# Patient Record
Sex: MALE | Race: WHITE | Employment: OTHER | URBAN - NONMETROPOLITAN AREA
[De-identification: names, ages, dates, MRNs, and addresses within clinical notes are randomized per-mention and may not be internally consistent; named-entity substitution may affect disease eponyms.]

---

## 2017-01-09 ENCOUNTER — OFFICE VISIT (OUTPATIENT)
Dept: VASCULAR SURGERY | Age: 67
End: 2017-01-09
Payer: COMMERCIAL

## 2017-01-09 ENCOUNTER — PREP FOR PROCEDURE (OUTPATIENT)
Dept: VASCULAR SURGERY | Age: 67
End: 2017-01-09

## 2017-01-09 ENCOUNTER — HOSPITAL ENCOUNTER (OUTPATIENT)
Dept: VASCULAR LAB | Age: 67
Discharge: HOME | End: 2017-01-09
Payer: COMMERCIAL

## 2017-01-09 VITALS — SYSTOLIC BLOOD PRESSURE: 138 MMHG | TEMPERATURE: 98.8 F | HEART RATE: 77 BPM | DIASTOLIC BLOOD PRESSURE: 77 MMHG

## 2017-01-09 DIAGNOSIS — I70.213 ATHEROSCLEROSIS OF NATIVE ARTERY OF BOTH LOWER EXTREMITIES WITH INTERMITTENT CLAUDICATION (HCC): Primary | ICD-10-CM

## 2017-01-09 DIAGNOSIS — Z01.818 PRE-OP TESTING: ICD-10-CM

## 2017-01-09 PROCEDURE — 99213 OFFICE O/P EST LOW 20 MIN: CPT | Performed by: PHYSICIAN ASSISTANT

## 2017-01-09 PROCEDURE — 93971 EXTREMITY STUDY: CPT

## 2017-01-09 RX ORDER — SODIUM CHLORIDE 0.9 % (FLUSH) 0.9 %
10 SYRINGE (ML) INJECTION EVERY 12 HOURS SCHEDULED
Status: CANCELLED | OUTPATIENT
Start: 2017-01-09

## 2017-01-09 RX ORDER — ASPIRIN 81 MG/1
81 TABLET ORAL ONCE
Status: CANCELLED | OUTPATIENT
Start: 2017-01-09 | End: 2017-01-09

## 2017-01-09 RX ORDER — SODIUM CHLORIDE 0.9 % (FLUSH) 0.9 %
10 SYRINGE (ML) INJECTION PRN
Status: CANCELLED | OUTPATIENT
Start: 2017-01-09

## 2017-01-11 ENCOUNTER — APPOINTMENT (OUTPATIENT)
Dept: INTERVENTIONAL RADIOLOGY/VASCULAR | Age: 67
End: 2017-01-11
Attending: SURGERY
Payer: COMMERCIAL

## 2017-01-11 ENCOUNTER — ANESTHESIA (OUTPATIENT)
Dept: OPERATING ROOM | Age: 67
End: 2017-01-11
Payer: COMMERCIAL

## 2017-01-11 ENCOUNTER — ANESTHESIA EVENT (OUTPATIENT)
Dept: OPERATING ROOM | Age: 67
End: 2017-01-11
Payer: COMMERCIAL

## 2017-01-11 VITALS — RESPIRATION RATE: 1 BRPM | TEMPERATURE: 97.4 F | OXYGEN SATURATION: 99 %

## 2017-01-11 PROCEDURE — 6360000002 HC RX W HCPCS: Performed by: NURSE ANESTHETIST, CERTIFIED REGISTERED

## 2017-01-11 PROCEDURE — 2580000003 HC RX 258: Performed by: NURSE ANESTHETIST, CERTIFIED REGISTERED

## 2017-01-11 PROCEDURE — 6360000002 HC RX W HCPCS: Performed by: SURGERY

## 2017-01-11 PROCEDURE — 2500000003 HC RX 250 WO HCPCS: Performed by: NURSE ANESTHETIST, CERTIFIED REGISTERED

## 2017-01-11 RX ORDER — ONDANSETRON 2 MG/ML
INJECTION INTRAMUSCULAR; INTRAVENOUS PRN
Status: DISCONTINUED | OUTPATIENT
Start: 2017-01-11 | End: 2017-01-11 | Stop reason: SDUPTHER

## 2017-01-11 RX ORDER — FENTANYL CITRATE 50 UG/ML
INJECTION, SOLUTION INTRAMUSCULAR; INTRAVENOUS PRN
Status: DISCONTINUED | OUTPATIENT
Start: 2017-01-11 | End: 2017-01-11 | Stop reason: SDUPTHER

## 2017-01-11 RX ORDER — PROPOFOL 10 MG/ML
INJECTION, EMULSION INTRAVENOUS PRN
Status: DISCONTINUED | OUTPATIENT
Start: 2017-01-11 | End: 2017-01-11 | Stop reason: SDUPTHER

## 2017-01-11 RX ORDER — HEPARIN SODIUM 1000 [USP'U]/ML
INJECTION, SOLUTION INTRAVENOUS; SUBCUTANEOUS PRN
Status: DISCONTINUED | OUTPATIENT
Start: 2017-01-11 | End: 2017-01-11 | Stop reason: SDUPTHER

## 2017-01-11 RX ORDER — SODIUM CHLORIDE, SODIUM LACTATE, POTASSIUM CHLORIDE, CALCIUM CHLORIDE 600; 310; 30; 20 MG/100ML; MG/100ML; MG/100ML; MG/100ML
INJECTION, SOLUTION INTRAVENOUS CONTINUOUS PRN
Status: DISCONTINUED | OUTPATIENT
Start: 2017-01-11 | End: 2017-01-11 | Stop reason: SDUPTHER

## 2017-01-11 RX ORDER — EPHEDRINE SULFATE 50 MG/ML
INJECTION, SOLUTION INTRAVENOUS PRN
Status: DISCONTINUED | OUTPATIENT
Start: 2017-01-11 | End: 2017-01-11 | Stop reason: SDUPTHER

## 2017-01-11 RX ORDER — GLYCOPYRROLATE 0.2 MG/ML
INJECTION INTRAMUSCULAR; INTRAVENOUS PRN
Status: DISCONTINUED | OUTPATIENT
Start: 2017-01-11 | End: 2017-01-11 | Stop reason: SDUPTHER

## 2017-01-11 RX ORDER — ROCURONIUM BROMIDE 10 MG/ML
INJECTION, SOLUTION INTRAVENOUS PRN
Status: DISCONTINUED | OUTPATIENT
Start: 2017-01-11 | End: 2017-01-11 | Stop reason: SDUPTHER

## 2017-01-11 RX ADMIN — HEPARIN SODIUM 2000 UNITS: 1000 INJECTION, SOLUTION INTRAVENOUS; SUBCUTANEOUS at 09:15

## 2017-01-11 RX ADMIN — SODIUM CHLORIDE, SODIUM LACTATE, POTASSIUM CHLORIDE, AND CALCIUM CHLORIDE: 600; 310; 30; 20 INJECTION, SOLUTION INTRAVENOUS at 08:15

## 2017-01-11 RX ADMIN — ONDANSETRON HYDROCHLORIDE 4 MG: 2 INJECTION, SOLUTION INTRAVENOUS at 10:00

## 2017-01-11 RX ADMIN — FENTANYL CITRATE 50 MCG: 50 INJECTION INTRAMUSCULAR; INTRAVENOUS at 09:40

## 2017-01-11 RX ADMIN — HEPARIN SODIUM 3000 UNITS: 1000 INJECTION, SOLUTION INTRAVENOUS; SUBCUTANEOUS at 08:15

## 2017-01-11 RX ADMIN — ROCURONIUM BROMIDE 40 MG: 10 INJECTION INTRAVENOUS at 07:35

## 2017-01-11 RX ADMIN — PHENYLEPHRINE HYDROCHLORIDE 100 MCG: 10 INJECTION INTRAVENOUS at 10:00

## 2017-01-11 RX ADMIN — NEOSTIGMINE METHYLSULFATE 3 MG: 1 INJECTION, SOLUTION INTRAMUSCULAR; INTRAVENOUS; SUBCUTANEOUS at 10:00

## 2017-01-11 RX ADMIN — Medication 2 G: at 07:49

## 2017-01-11 RX ADMIN — EPHEDRINE SULFATE 10 MG: 50 INJECTION, SOLUTION INTRAMUSCULAR; INTRAVENOUS; SUBCUTANEOUS at 10:00

## 2017-01-11 RX ADMIN — EPHEDRINE SULFATE 10 MG: 50 INJECTION, SOLUTION INTRAMUSCULAR; INTRAVENOUS; SUBCUTANEOUS at 07:45

## 2017-01-11 RX ADMIN — FENTANYL CITRATE 100 MCG: 50 INJECTION INTRAMUSCULAR; INTRAVENOUS at 07:35

## 2017-01-11 RX ADMIN — GLYCOPYRROLATE 0.6 MG: 0.2 INJECTION, SOLUTION INTRAMUSCULAR; INTRAVENOUS at 10:00

## 2017-01-11 RX ADMIN — PROPOFOL 150 MG: 10 INJECTION, EMULSION INTRAVENOUS at 07:35

## 2017-01-11 RX ADMIN — SODIUM CHLORIDE, SODIUM LACTATE, POTASSIUM CHLORIDE, AND CALCIUM CHLORIDE: 600; 310; 30; 20 INJECTION, SOLUTION INTRAVENOUS at 07:29

## 2017-01-11 RX ADMIN — PHENYLEPHRINE HYDROCHLORIDE 50 MCG: 10 INJECTION INTRAVENOUS at 08:17

## 2017-01-11 RX ADMIN — HEPARIN SODIUM 3000 UNITS: 1000 INJECTION, SOLUTION INTRAVENOUS; SUBCUTANEOUS at 08:03

## 2017-01-11 RX ADMIN — EPHEDRINE SULFATE 10 MG: 50 INJECTION, SOLUTION INTRAMUSCULAR; INTRAVENOUS; SUBCUTANEOUS at 08:00

## 2017-01-11 RX ADMIN — PROPOFOL 50 MG: 10 INJECTION, EMULSION INTRAVENOUS at 07:40

## 2017-01-25 ENCOUNTER — OFFICE VISIT (OUTPATIENT)
Dept: VASCULAR SURGERY | Age: 67
End: 2017-01-25
Payer: COMMERCIAL

## 2017-01-25 VITALS
SYSTOLIC BLOOD PRESSURE: 130 MMHG | RESPIRATION RATE: 18 BRPM | HEART RATE: 73 BPM | DIASTOLIC BLOOD PRESSURE: 73 MMHG | TEMPERATURE: 96.7 F

## 2017-01-25 DIAGNOSIS — I70.213 ATHEROSCLEROSIS OF NATIVE ARTERY OF BOTH LOWER EXTREMITIES WITH INTERMITTENT CLAUDICATION (HCC): Primary | ICD-10-CM

## 2017-01-25 PROCEDURE — 99212 OFFICE O/P EST SF 10 MIN: CPT | Performed by: NURSE PRACTITIONER

## 2017-01-25 RX ORDER — OMEPRAZOLE 20 MG/1
20 CAPSULE, DELAYED RELEASE ORAL DAILY
COMMUNITY
End: 2020-11-09

## 2017-01-25 RX ORDER — PANTOPRAZOLE SODIUM 40 MG/1
40 TABLET, DELAYED RELEASE ORAL DAILY
Qty: 30 TABLET | Refills: 3 | Status: SHIPPED | OUTPATIENT
Start: 2017-01-25 | End: 2017-04-27 | Stop reason: CLARIF

## 2017-04-27 ENCOUNTER — HOSPITAL ENCOUNTER (OUTPATIENT)
Dept: VASCULAR LAB | Age: 67
Discharge: HOME | End: 2017-04-27
Payer: COMMERCIAL

## 2017-04-27 ENCOUNTER — OFFICE VISIT (OUTPATIENT)
Dept: VASCULAR SURGERY | Age: 67
End: 2017-04-27
Payer: COMMERCIAL

## 2017-04-27 VITALS
TEMPERATURE: 97.5 F | HEART RATE: 55 BPM | SYSTOLIC BLOOD PRESSURE: 138 MMHG | DIASTOLIC BLOOD PRESSURE: 70 MMHG | RESPIRATION RATE: 18 BRPM

## 2017-04-27 DIAGNOSIS — I70.213 ATHEROSCLEROSIS OF NATIVE ARTERY OF BOTH LOWER EXTREMITIES WITH INTERMITTENT CLAUDICATION (HCC): Primary | ICD-10-CM

## 2017-04-27 DIAGNOSIS — I70.213 ATHEROSCLEROSIS OF NATIVE ARTERY OF BOTH LOWER EXTREMITIES WITH INTERMITTENT CLAUDICATION (HCC): ICD-10-CM

## 2017-04-27 PROCEDURE — 99212 OFFICE O/P EST SF 10 MIN: CPT | Performed by: NURSE PRACTITIONER

## 2017-04-27 PROCEDURE — 93922 UPR/L XTREMITY ART 2 LEVELS: CPT

## 2017-04-27 PROCEDURE — 93925 LOWER EXTREMITY STUDY: CPT

## 2017-04-27 RX ORDER — LOSARTAN POTASSIUM 50 MG/1
50 TABLET ORAL DAILY
COMMUNITY
End: 2018-07-05

## 2017-04-27 RX ORDER — ONDANSETRON 4 MG/1
4 TABLET, ORALLY DISINTEGRATING ORAL EVERY 8 HOURS PRN
COMMUNITY
End: 2018-05-16 | Stop reason: ALTCHOICE

## 2017-04-27 RX ORDER — POLYETHYLENE GLYCOL 3350 17 G/17G
17 POWDER, FOR SOLUTION ORAL DAILY
COMMUNITY
End: 2022-02-16 | Stop reason: CLARIF

## 2017-08-02 ENCOUNTER — HOSPITAL ENCOUNTER (OUTPATIENT)
Dept: VASCULAR LAB | Age: 67
Discharge: HOME | End: 2017-08-02
Payer: COMMERCIAL

## 2017-08-02 ENCOUNTER — OFFICE VISIT (OUTPATIENT)
Dept: VASCULAR SURGERY | Age: 67
End: 2017-08-02
Payer: COMMERCIAL

## 2017-08-02 VITALS
SYSTOLIC BLOOD PRESSURE: 156 MMHG | BODY MASS INDEX: 24.96 KG/M2 | DIASTOLIC BLOOD PRESSURE: 82 MMHG | RESPIRATION RATE: 18 BRPM | TEMPERATURE: 98.7 F | HEART RATE: 59 BPM | WEIGHT: 159 LBS | HEIGHT: 67 IN

## 2017-08-02 DIAGNOSIS — I73.9 PVD (PERIPHERAL VASCULAR DISEASE) (HCC): Primary | ICD-10-CM

## 2017-08-02 DIAGNOSIS — I70.213 ATHEROSCLEROSIS OF NATIVE ARTERY OF BOTH LOWER EXTREMITIES WITH INTERMITTENT CLAUDICATION (HCC): ICD-10-CM

## 2017-08-02 PROCEDURE — 99213 OFFICE O/P EST LOW 20 MIN: CPT | Performed by: PHYSICIAN ASSISTANT

## 2017-08-02 PROCEDURE — 93925 LOWER EXTREMITY STUDY: CPT

## 2017-08-02 PROCEDURE — 93922 UPR/L XTREMITY ART 2 LEVELS: CPT

## 2017-08-02 RX ORDER — TRIAMCINOLONE ACETONIDE 0.25 MG/G
OINTMENT TOPICAL
Qty: 18 G | Refills: 0 | Status: SHIPPED | OUTPATIENT
Start: 2017-08-02 | End: 2017-08-09

## 2017-08-02 RX ORDER — CLOPIDOGREL BISULFATE 75 MG/1
75 TABLET ORAL DAILY
Qty: 30 TABLET | Refills: 5 | Status: SHIPPED | OUTPATIENT
Start: 2017-08-02 | End: 2017-10-16 | Stop reason: SDUPTHER

## 2017-08-08 DIAGNOSIS — I70.213 ATHEROSCLEROSIS OF NATIVE ARTERY OF BOTH LOWER EXTREMITIES WITH INTERMITTENT CLAUDICATION (HCC): Primary | ICD-10-CM

## 2017-10-16 RX ORDER — CLOPIDOGREL BISULFATE 75 MG/1
75 TABLET ORAL DAILY
Qty: 30 TABLET | Refills: 3 | Status: SHIPPED | OUTPATIENT
Start: 2017-10-16 | End: 2018-01-19 | Stop reason: SDUPTHER

## 2018-02-15 ENCOUNTER — OFFICE VISIT (OUTPATIENT)
Dept: VASCULAR SURGERY | Age: 68
End: 2018-02-15
Payer: MEDICARE

## 2018-02-15 ENCOUNTER — HOSPITAL ENCOUNTER (OUTPATIENT)
Dept: VASCULAR LAB | Age: 68
Discharge: HOME | End: 2018-02-15
Payer: MEDICARE

## 2018-02-15 ENCOUNTER — TELEPHONE (OUTPATIENT)
Dept: VASCULAR SURGERY | Age: 68
End: 2018-02-15

## 2018-02-15 VITALS
RESPIRATION RATE: 18 BRPM | TEMPERATURE: 97.6 F | HEART RATE: 73 BPM | OXYGEN SATURATION: 98 % | SYSTOLIC BLOOD PRESSURE: 132 MMHG | DIASTOLIC BLOOD PRESSURE: 80 MMHG

## 2018-02-15 DIAGNOSIS — I70.213 ATHEROSCLEROSIS OF NATIVE ARTERY OF BOTH LOWER EXTREMITIES WITH INTERMITTENT CLAUDICATION (HCC): Primary | ICD-10-CM

## 2018-02-15 DIAGNOSIS — I70.213 ATHEROSCLEROSIS OF NATIVE ARTERY OF BOTH LOWER EXTREMITIES WITH INTERMITTENT CLAUDICATION (HCC): ICD-10-CM

## 2018-02-15 PROCEDURE — 93925 LOWER EXTREMITY STUDY: CPT

## 2018-02-15 PROCEDURE — 1123F ACP DISCUSS/DSCN MKR DOCD: CPT | Performed by: NURSE PRACTITIONER

## 2018-02-15 PROCEDURE — G8484 FLU IMMUNIZE NO ADMIN: HCPCS | Performed by: NURSE PRACTITIONER

## 2018-02-15 PROCEDURE — G8427 DOCREV CUR MEDS BY ELIG CLIN: HCPCS | Performed by: NURSE PRACTITIONER

## 2018-02-15 PROCEDURE — 4040F PNEUMOC VAC/ADMIN/RCVD: CPT | Performed by: NURSE PRACTITIONER

## 2018-02-15 PROCEDURE — 99213 OFFICE O/P EST LOW 20 MIN: CPT | Performed by: NURSE PRACTITIONER

## 2018-02-15 PROCEDURE — G8421 BMI NOT CALCULATED: HCPCS | Performed by: NURSE PRACTITIONER

## 2018-02-15 PROCEDURE — 4004F PT TOBACCO SCREEN RCVD TLK: CPT | Performed by: NURSE PRACTITIONER

## 2018-02-15 PROCEDURE — 3017F COLORECTAL CA SCREEN DOC REV: CPT | Performed by: NURSE PRACTITIONER

## 2018-02-15 PROCEDURE — G8598 ASA/ANTIPLAT THER USED: HCPCS | Performed by: NURSE PRACTITIONER

## 2018-02-15 PROCEDURE — 93922 UPR/L XTREMITY ART 2 LEVELS: CPT

## 2018-02-15 NOTE — TELEPHONE ENCOUNTER
I scheduled the patient an appt with  in [facility]. I gave the patient his appointment for next Wednesday 2/21/18 at 2:15p. The patient confirmed the appointment.

## 2018-02-15 NOTE — PROGRESS NOTES
MINUTES AFTER THE SAME MEAL QD  11    ASPIRIN LOW DOSE 81 MG EC tablet TK 1 T PO QD  11     No current facility-administered medications for this visit. Allergies: Review of patient's allergies indicates no known allergies. Past Medical History:   Diagnosis Date    Depression     GERD with esophagitis     Hyperlipidemia     Hypertension     Neuropathy ([address]      Past Surgical History:   Procedure Laterality Date    COLONOSCOPY      FEMORAL BYPASS Left 11/16/2016    LEFT LOWER EXTREMITY ANGIOGRAM WITH ATHERECTOMY, BALLOON ANGIPLASTY, AND STENTING OF LEFT FEMORAL AND POPLITEAL ARTERIES performed by Kemi Tomas MD at [facility] Right 1/11/2017    INTRAOPERATIVE ANGIOGRAM RIGHT LOWER EXTREMITY WITH PERCUTANEOUS TRANSLUMINAL BALLOON ANGIOPLASTY AND STENTING OF RIGHT SUPERFICIAL FEMORAL/ POPLITEAL ARTERIES performed by Kemi Tomas MD at [address] [facility] VASCULAR SURGERY  10/11/2016    Right CFA 5f sheath, Aortogram with bilateral lower arteriogram, mynx right CFA.  [facility]    VASCULAR SURGERY  11/16/2016    SJS-Ultrasound guided left PTA 4f sheath, right CFA 5f-7f sheath, left lower arteriograms, placement of left popliteal artery emboshield filter, atherectomy left SFA with 2.1/3.0 jetstream, left popliteal/sfa balloon angioplasty 5x200 desiree, 6x150 lutonix (3), left popliteal/sfa stent 6x200 innova, left SFA stents (supera 5.5x150, 5.5x60), left lower extremity arteriograms, mynx right CFA    VASCULAR SURGERY  01/11/2017    [facility]-Ultrasound left CFA 5f-7f 70cm sheath, right lower arteriograms, right ROSEMARIE u/s guided 4f sheath, balloon angioplasty right SFA/Pop occulsion with two 5x100 desiree,Right SFA/pop balloon angioplasty 6x200 desiree, right SFA/pop stents (supera 5.5x150, 5.5x60),Right SFA/pop stents balloon angioplasty 6x150 lutonix, right lower arteriograms, right SFA stent (epic 7x40), right lower arteriograms    VASCULAR SURGERY  01/11/2017 distress. HENT - head normocephalic. Right external ear canal appears normal.  Left external ear canal appears normal.  Septum appears midline. Eyes - conjunctiva normal.  EOMS normal.  No exudate. No icterus. Neck- ROM appears normal, no tracheal deviation. Cardiovascular - Regular rate and rhythm. Heart sounds are normal.  No murmur, rub, or gallop. Carotid pulses are 2+ to palpation bilaterally without bruit. Extremities - Radial and brachial pulses are 2+ to palpation bilaterally. Right femoral pulse: present 2+; Right popliteal pulse: absent Right DP: present 2+; Right PT absent; Left femoral pulse: present 2+; Left popliteal pulse: absent; Left DP: present 2+; Left PT: absent No cyanosis, clubbing, or significant edema. No signs atheroembolic event. Pulmonary - effort appears normal.  No respiratory distress. Lungs - Breath sounds normal. No wheezes or rales. GI - Abdomen - soft, non tender, bowel sounds X 4 quadrants. No guarding or rebound tenderness. No distension or palpable mass. Genitourinary - deferred. Musculoskeletal - ROM appears normal.  No significant edema. Neurologic - alert and oriented X 3. Physiologic. Skin - warm, dry, and intact. No rash, erythema, or pallor. Psychiatric - mood, affect, and behavior appear normal.  Judgment and thought processes appear normal.    Risk factors for atherosclerosis of all vascular beds have been reviewed with the patient including:  Family history, tobacco abuse in all forms, elevated cholesterol, hyperlipidemia, and diabetes. Lower extremity arterial study: Right VADIM 1.02, Left VADIM 0.99 with pop velocity of 275. Individual films reviewed: Yes. Test results were reviewed with the patient. Disease process has progressed      Options have been discussed with the patient including continued medical management and angiography and potential intervention. Patient has opted to proceed with angiography and potential intervention.

## 2018-05-03 ENCOUNTER — HOSPITAL ENCOUNTER (OUTPATIENT)
Dept: VASCULAR LAB | Age: 68
Discharge: HOME | End: 2018-05-03
Payer: MEDICARE

## 2018-05-03 ENCOUNTER — OFFICE VISIT (OUTPATIENT)
Dept: VASCULAR SURGERY | Age: 68
End: 2018-05-03
Payer: MEDICARE

## 2018-05-03 VITALS — DIASTOLIC BLOOD PRESSURE: 69 MMHG | HEART RATE: 79 BPM | SYSTOLIC BLOOD PRESSURE: 107 MMHG

## 2018-05-03 DIAGNOSIS — I70.213 ATHEROSCLEROSIS OF NATIVE ARTERY OF BOTH LOWER EXTREMITIES WITH INTERMITTENT CLAUDICATION (HCC): ICD-10-CM

## 2018-05-03 DIAGNOSIS — Z01.818 PRE-OP TESTING: ICD-10-CM

## 2018-05-03 DIAGNOSIS — I70.213 ATHEROSCLEROSIS OF NATIVE ARTERY OF BOTH LOWER EXTREMITIES WITH INTERMITTENT CLAUDICATION (HCC): Primary | ICD-10-CM

## 2018-05-03 LAB
ANION GAP SERPL CALCULATED.3IONS-SCNC: 12 MMOL/L (ref 7–19)
BUN BLDV-MCNC: 13 MG/DL (ref 8–23)
CALCIUM SERPL-MCNC: 9.6 MG/DL (ref 8.8–10.2)
CHLORIDE BLD-SCNC: 99 MMOL/L (ref 98–111)
CO2: 27 MMOL/L (ref 22–29)
CREAT SERPL-MCNC: 1.2 MG/DL (ref 0.5–1.2)
GFR NON-AFRICAN AMERICAN: >60
GLUCOSE BLD-MCNC: 96 MG/DL (ref 74–109)
HCT VFR BLD CALC: 43.6 % (ref 42–52)
HEMOGLOBIN: 14.5 G/DL (ref 14–18)
MCH RBC QN AUTO: 30.9 PG (ref 27–31)
MCHC RBC AUTO-ENTMCNC: 33.3 G/DL (ref 33–37)
MCV RBC AUTO: 93 FL (ref 80–94)
PDW BLD-RTO: 13.2 % (ref 11.5–14.5)
PLATELET # BLD: 311 K/UL (ref 130–400)
PMV BLD AUTO: 9.4 FL (ref 9.4–12.4)
POTASSIUM SERPL-SCNC: 4.6 MMOL/L (ref 3.5–5)
RBC # BLD: 4.69 M/UL (ref 4.7–6.1)
SODIUM BLD-SCNC: 138 MMOL/L (ref 136–145)
WBC # BLD: 11.7 K/UL (ref 4.8–10.8)

## 2018-05-03 PROCEDURE — 99213 OFFICE O/P EST LOW 20 MIN: CPT | Performed by: PHYSICIAN ASSISTANT

## 2018-05-03 PROCEDURE — 93922 UPR/L XTREMITY ART 2 LEVELS: CPT

## 2018-05-03 PROCEDURE — 93925 LOWER EXTREMITY STUDY: CPT

## 2018-05-03 RX ORDER — PANTOPRAZOLE SODIUM 40 MG/1
40 GRANULE, DELAYED RELEASE ORAL
COMMUNITY

## 2018-05-03 RX ORDER — HYDROCODONE BITARTRATE AND ACETAMINOPHEN 5; 325 MG/1; MG/1
1 TABLET ORAL EVERY 6 HOURS PRN
COMMUNITY
End: 2018-05-03 | Stop reason: CLARIF

## 2018-05-03 RX ORDER — ACETAMINOPHEN 325 MG/1
650 TABLET ORAL EVERY 6 HOURS PRN
COMMUNITY
End: 2022-02-16 | Stop reason: CLARIF

## 2018-05-03 RX ORDER — COVID-19 ANTIGEN TEST
KIT MISCELLANEOUS EVERY 4 HOURS PRN
COMMUNITY
End: 2020-05-21

## 2018-05-04 ENCOUNTER — PREP FOR PROCEDURE (OUTPATIENT)
Dept: VASCULAR SURGERY | Age: 68
End: 2018-05-04

## 2018-05-04 RX ORDER — SODIUM CHLORIDE 0.9 % (FLUSH) 0.9 %
10 SYRINGE (ML) INJECTION PRN
Status: CANCELLED | OUTPATIENT
Start: 2018-05-04

## 2018-05-04 RX ORDER — ASPIRIN 81 MG/1
81 TABLET ORAL ONCE
Status: CANCELLED | OUTPATIENT
Start: 2018-05-04 | End: 2018-05-04

## 2018-05-04 RX ORDER — SODIUM CHLORIDE 9 MG/ML
INJECTION, SOLUTION INTRAVENOUS CONTINUOUS
Status: CANCELLED | OUTPATIENT
Start: 2018-05-04

## 2018-05-16 ENCOUNTER — HOSPITAL ENCOUNTER (OUTPATIENT)
Dept: INTERVENTIONAL RADIOLOGY/VASCULAR | Age: 68
Discharge: HOME | End: 2018-05-16
Payer: MEDICARE

## 2018-05-16 VITALS
WEIGHT: 163 LBS | SYSTOLIC BLOOD PRESSURE: 154 MMHG | OXYGEN SATURATION: 99 % | BODY MASS INDEX: 26.2 KG/M2 | RESPIRATION RATE: 25 BRPM | HEIGHT: 66 IN | TEMPERATURE: 97.7 F | DIASTOLIC BLOOD PRESSURE: 76 MMHG | HEART RATE: 67 BPM

## 2018-05-16 DIAGNOSIS — I70.213 ATHEROSCLEROSIS OF NATIVE ARTERIES OF EXTREMITIES WITH INTERMITTENT CLAUDICATION, BILATERAL LEGS (HCC): ICD-10-CM

## 2018-05-16 PROCEDURE — G0269 OCCLUSIVE DEVICE IN VEIN ART: HCPCS | Performed by: SURGERY

## 2018-05-16 PROCEDURE — 36200 PLACE CATHETER IN AORTA: CPT | Performed by: SURGERY

## 2018-05-16 PROCEDURE — 6360000004 HC RX CONTRAST MEDICATION: Performed by: SURGERY

## 2018-05-16 PROCEDURE — 2500000003 HC RX 250 WO HCPCS: Performed by: SURGERY

## 2018-05-16 PROCEDURE — 2580000003 HC RX 258: Performed by: PHYSICIAN ASSISTANT

## 2018-05-16 PROCEDURE — 75716 ARTERY X-RAYS ARMS/LEGS: CPT | Performed by: SURGERY

## 2018-05-16 PROCEDURE — 6370000000 HC RX 637 (ALT 250 FOR IP): Performed by: PHYSICIAN ASSISTANT

## 2018-05-16 PROCEDURE — 6360000002 HC RX W HCPCS: Performed by: SURGERY

## 2018-05-16 PROCEDURE — 75625 CONTRAST EXAM ABDOMINL AORTA: CPT | Performed by: SURGERY

## 2018-05-16 PROCEDURE — 6360000002 HC RX W HCPCS: Performed by: PHYSICIAN ASSISTANT

## 2018-05-16 RX ORDER — HYDROCODONE BITARTRATE AND ACETAMINOPHEN 5; 325 MG/1; MG/1
1 TABLET ORAL EVERY 4 HOURS PRN
Status: DISCONTINUED | OUTPATIENT
Start: 2018-05-16 | End: 2018-05-18 | Stop reason: HOSPADM

## 2018-05-16 RX ORDER — ONDANSETRON 2 MG/ML
4 INJECTION INTRAMUSCULAR; INTRAVENOUS EVERY 8 HOURS PRN
Status: DISCONTINUED | OUTPATIENT
Start: 2018-05-16 | End: 2018-05-18 | Stop reason: HOSPADM

## 2018-05-16 RX ORDER — ASPIRIN 81 MG/1
81 TABLET ORAL ONCE
Status: COMPLETED | OUTPATIENT
Start: 2018-05-16 | End: 2018-05-16

## 2018-05-16 RX ORDER — FENTANYL CITRATE 50 UG/ML
INJECTION, SOLUTION INTRAMUSCULAR; INTRAVENOUS
Status: COMPLETED | OUTPATIENT
Start: 2018-05-16 | End: 2018-05-16

## 2018-05-16 RX ORDER — HEPARIN SODIUM 5000 [USP'U]/ML
INJECTION, SOLUTION INTRAVENOUS; SUBCUTANEOUS
Status: COMPLETED | OUTPATIENT
Start: 2018-05-16 | End: 2018-05-16

## 2018-05-16 RX ORDER — CILOSTAZOL 100 MG/1
50 TABLET ORAL 2 TIMES DAILY
Qty: 60 TABLET | Refills: 5 | Status: SHIPPED | OUTPATIENT
Start: 2018-05-16 | End: 2018-07-05

## 2018-05-16 RX ORDER — ACETAMINOPHEN 325 MG/1
650 TABLET ORAL EVERY 4 HOURS PRN
Status: DISCONTINUED | OUTPATIENT
Start: 2018-05-16 | End: 2018-05-18 | Stop reason: HOSPADM

## 2018-05-16 RX ORDER — IODIXANOL 320 MG/ML
INJECTION, SOLUTION INTRAVASCULAR
Status: COMPLETED | OUTPATIENT
Start: 2018-05-16 | End: 2018-05-16

## 2018-05-16 RX ORDER — SODIUM CHLORIDE 0.9 % (FLUSH) 0.9 %
10 SYRINGE (ML) INJECTION PRN
Status: DISCONTINUED | OUTPATIENT
Start: 2018-05-16 | End: 2018-05-18 | Stop reason: HOSPADM

## 2018-05-16 RX ORDER — HYDROCODONE BITARTRATE AND ACETAMINOPHEN 5; 325 MG/1; MG/1
2 TABLET ORAL EVERY 4 HOURS PRN
Status: DISCONTINUED | OUTPATIENT
Start: 2018-05-16 | End: 2018-05-18 | Stop reason: HOSPADM

## 2018-05-16 RX ORDER — MIDAZOLAM HYDROCHLORIDE 1 MG/ML
INJECTION INTRAMUSCULAR; INTRAVENOUS
Status: COMPLETED | OUTPATIENT
Start: 2018-05-16 | End: 2018-05-16

## 2018-05-16 RX ORDER — SODIUM CHLORIDE 9 MG/ML
INJECTION, SOLUTION INTRAVENOUS CONTINUOUS
Status: DISCONTINUED | OUTPATIENT
Start: 2018-05-16 | End: 2018-05-18 | Stop reason: HOSPADM

## 2018-05-16 RX ORDER — LIDOCAINE HYDROCHLORIDE 20 MG/ML
INJECTION, SOLUTION INFILTRATION; PERINEURAL
Status: COMPLETED | OUTPATIENT
Start: 2018-05-16 | End: 2018-05-16

## 2018-05-16 RX ADMIN — ASPIRIN 81 MG: 81 TABLET, COATED ORAL at 11:36

## 2018-05-16 RX ADMIN — FENTANYL CITRATE 25 MCG: 50 INJECTION, SOLUTION INTRAMUSCULAR; INTRAVENOUS at 12:14

## 2018-05-16 RX ADMIN — Medication 2 G: at 12:04

## 2018-05-16 RX ADMIN — LIDOCAINE HYDROCHLORIDE 10 ML: 20 INJECTION, SOLUTION INFILTRATION; PERINEURAL at 12:17

## 2018-05-16 RX ADMIN — SODIUM CHLORIDE: 9 INJECTION, SOLUTION INTRAVENOUS at 11:28

## 2018-05-16 RX ADMIN — IODIXANOL 120 ML: 320 INJECTION, SOLUTION INTRAVASCULAR at 12:33

## 2018-05-16 RX ADMIN — HEPARIN SODIUM 5000 UNITS: 5000 INJECTION, SOLUTION INTRAVENOUS; SUBCUTANEOUS at 12:08

## 2018-05-16 RX ADMIN — MIDAZOLAM HYDROCHLORIDE 1 MG: 1 INJECTION, SOLUTION INTRAMUSCULAR; INTRAVENOUS at 12:14

## 2018-06-04 ENCOUNTER — OFFICE VISIT (OUTPATIENT)
Dept: VASCULAR SURGERY | Age: 68
End: 2018-06-04
Payer: MEDICARE

## 2018-06-04 VITALS
HEART RATE: 79 BPM | TEMPERATURE: 97.4 F | DIASTOLIC BLOOD PRESSURE: 70 MMHG | OXYGEN SATURATION: 97 % | SYSTOLIC BLOOD PRESSURE: 120 MMHG | RESPIRATION RATE: 18 BRPM

## 2018-06-04 DIAGNOSIS — I70.213 ATHEROSCLEROSIS OF NATIVE ARTERIES OF EXTREMITIES WITH INTERMITTENT CLAUDICATION, BILATERAL LEGS (HCC): Primary | ICD-10-CM

## 2018-06-04 PROCEDURE — 99213 OFFICE O/P EST LOW 20 MIN: CPT | Performed by: PHYSICIAN ASSISTANT

## 2018-06-21 ENCOUNTER — TELEPHONE (OUTPATIENT)
Dept: VASCULAR SURGERY | Age: 68
End: 2018-06-21

## 2018-06-21 DIAGNOSIS — Z01.818 PRE-OP TESTING: Primary | ICD-10-CM

## 2018-06-22 ENCOUNTER — PREP FOR PROCEDURE (OUTPATIENT)
Dept: VASCULAR SURGERY | Age: 68
End: 2018-06-22

## 2018-06-22 RX ORDER — SODIUM CHLORIDE 0.9 % (FLUSH) 0.9 %
10 SYRINGE (ML) INJECTION EVERY 12 HOURS SCHEDULED
Status: CANCELLED | OUTPATIENT
Start: 2018-06-22

## 2018-06-22 RX ORDER — SODIUM CHLORIDE 0.9 % (FLUSH) 0.9 %
10 SYRINGE (ML) INJECTION PRN
Status: CANCELLED | OUTPATIENT
Start: 2018-06-22

## 2018-06-22 RX ORDER — ASPIRIN 81 MG/1
81 TABLET ORAL ONCE
Status: CANCELLED | OUTPATIENT
Start: 2018-06-22 | End: 2018-06-22

## 2018-07-05 ENCOUNTER — HOSPITAL ENCOUNTER (OUTPATIENT)
Dept: GENERAL RADIOLOGY | Age: 68
Discharge: HOME | End: 2018-07-05
Payer: MEDICARE

## 2018-07-05 ENCOUNTER — HOSPITAL ENCOUNTER (OUTPATIENT)
Dept: PREADMISSION TESTING | Age: 68
Discharge: HOME | End: 2018-07-09
Payer: MEDICARE

## 2018-07-05 ENCOUNTER — HOSPITAL ENCOUNTER (OUTPATIENT)
Dept: VASCULAR LAB | Age: 68
Discharge: HOME | End: 2018-07-05
Payer: MEDICARE

## 2018-07-05 VITALS — BODY MASS INDEX: 25.27 KG/M2 | WEIGHT: 161 LBS | HEIGHT: 67 IN

## 2018-07-05 DIAGNOSIS — Z01.818 PRE-OP TESTING: ICD-10-CM

## 2018-07-05 LAB
ANION GAP SERPL CALCULATED.3IONS-SCNC: 14 MMOL/L (ref 7–19)
APTT: 26.1 SEC (ref 26–36.2)
BASOPHILS ABSOLUTE: 0.1 K/UL (ref 0–0.2)
BASOPHILS RELATIVE PERCENT: 0.8 % (ref 0–1)
BUN BLDV-MCNC: 15 MG/DL (ref 8–23)
CALCIUM SERPL-MCNC: 9.8 MG/DL (ref 8.8–10.2)
CHLORIDE BLD-SCNC: 100 MMOL/L (ref 98–111)
CO2: 25 MMOL/L (ref 22–29)
CREAT SERPL-MCNC: 1.1 MG/DL (ref 0.5–1.2)
EOSINOPHILS ABSOLUTE: 0.2 K/UL (ref 0–0.6)
EOSINOPHILS RELATIVE PERCENT: 1.8 % (ref 0–5)
GFR NON-AFRICAN AMERICAN: >60
GLUCOSE BLD-MCNC: 75 MG/DL (ref 74–109)
HCT VFR BLD CALC: 44 % (ref 42–52)
HEMOGLOBIN: 14.4 G/DL (ref 14–18)
INR BLD: 0.89 (ref 0.88–1.18)
LYMPHOCYTES ABSOLUTE: 3.4 K/UL (ref 1.1–4.5)
LYMPHOCYTES RELATIVE PERCENT: 32.6 % (ref 20–40)
MCH RBC QN AUTO: 30.7 PG (ref 27–31)
MCHC RBC AUTO-ENTMCNC: 32.7 G/DL (ref 33–37)
MCV RBC AUTO: 93.8 FL (ref 80–94)
MONOCYTES ABSOLUTE: 1.1 K/UL (ref 0–0.9)
MONOCYTES RELATIVE PERCENT: 10.1 % (ref 0–10)
NEUTROPHILS ABSOLUTE: 5.6 K/UL (ref 1.5–7.5)
NEUTROPHILS RELATIVE PERCENT: 53.9 % (ref 50–65)
PDW BLD-RTO: 13.4 % (ref 11.5–14.5)
PLATELET # BLD: 347 K/UL (ref 130–400)
PMV BLD AUTO: 9.4 FL (ref 9.4–12.4)
POTASSIUM SERPL-SCNC: 4.3 MMOL/L (ref 3.5–5)
PROTHROMBIN TIME: 11.9 SEC (ref 12–14.6)
RBC # BLD: 4.69 M/UL (ref 4.7–6.1)
SODIUM BLD-SCNC: 139 MMOL/L (ref 136–145)
WBC # BLD: 10.4 K/UL (ref 4.8–10.8)

## 2018-07-05 PROCEDURE — 93005 ELECTROCARDIOGRAM TRACING: CPT

## 2018-07-05 PROCEDURE — 85610 PROTHROMBIN TIME: CPT

## 2018-07-05 PROCEDURE — 93970 EXTREMITY STUDY: CPT

## 2018-07-05 PROCEDURE — 85730 THROMBOPLASTIN TIME PARTIAL: CPT

## 2018-07-05 PROCEDURE — 80048 BASIC METABOLIC PNL TOTAL CA: CPT

## 2018-07-05 PROCEDURE — 71046 X-RAY EXAM CHEST 2 VIEWS: CPT

## 2018-07-05 PROCEDURE — 85025 COMPLETE CBC W/AUTO DIFF WBC: CPT

## 2018-07-05 PROCEDURE — 87070 CULTURE OTHR SPECIMN AEROBIC: CPT

## 2018-07-05 RX ORDER — ONDANSETRON 4 MG/1
4 TABLET, ORALLY DISINTEGRATING ORAL EVERY 8 HOURS PRN
COMMUNITY
End: 2022-02-16 | Stop reason: CLARIF

## 2018-07-05 RX ORDER — LISINOPRIL 20 MG/1
20 TABLET ORAL DAILY
COMMUNITY
End: 2022-02-16 | Stop reason: CLARIF

## 2018-07-05 RX ORDER — GABAPENTIN 300 MG/1
300 CAPSULE ORAL NIGHTLY
COMMUNITY
End: 2022-02-16 | Stop reason: CLARIF

## 2018-07-05 RX ORDER — HYDROCODONE BITARTRATE AND ACETAMINOPHEN 5; 325 MG/1; MG/1
1 TABLET ORAL EVERY 6 HOURS PRN
Status: ON HOLD | COMMUNITY
End: 2018-07-17 | Stop reason: ALTCHOICE

## 2018-07-05 RX ORDER — NICOTINE 21 MG/24HR
1 PATCH, TRANSDERMAL 24 HOURS TRANSDERMAL EVERY 24 HOURS
COMMUNITY
End: 2020-05-21

## 2018-07-05 RX ORDER — TAMSULOSIN HYDROCHLORIDE 0.4 MG/1
0.4 CAPSULE ORAL DAILY
COMMUNITY
End: 2020-11-09

## 2018-07-06 LAB
EKG P AXIS: 17 DEGREES
EKG P-R INTERVAL: 138 MS
EKG Q-T INTERVAL: 418 MS
EKG QRS DURATION: 96 MS
EKG QTC CALCULATION (BAZETT): 426 MS
EKG T AXIS: 28 DEGREES
MRSA CULTURE ONLY: NORMAL

## 2018-07-17 ENCOUNTER — HOSPITAL ENCOUNTER (OUTPATIENT)
Dept: VASCULAR LAB | Age: 68
Discharge: HOME | DRG: 253 | End: 2018-07-17
Payer: MEDICARE

## 2018-07-17 ENCOUNTER — HOSPITAL ENCOUNTER (INPATIENT)
Age: 68
LOS: 2 days | Discharge: HOME | DRG: 253 | End: 2018-07-19
Attending: SURGERY | Admitting: SURGERY
Payer: MEDICARE

## 2018-07-17 ENCOUNTER — ANESTHESIA (OUTPATIENT)
Dept: OPERATING ROOM | Age: 68
DRG: 253 | End: 2018-07-17
Payer: MEDICARE

## 2018-07-17 ENCOUNTER — ANESTHESIA EVENT (OUTPATIENT)
Dept: OPERATING ROOM | Age: 68
DRG: 253 | End: 2018-07-17
Payer: MEDICARE

## 2018-07-17 ENCOUNTER — APPOINTMENT (OUTPATIENT)
Dept: INTERVENTIONAL RADIOLOGY/VASCULAR | Age: 68
DRG: 253 | End: 2018-07-17
Attending: SURGERY
Payer: MEDICARE

## 2018-07-17 VITALS
DIASTOLIC BLOOD PRESSURE: 63 MMHG | TEMPERATURE: 97.4 F | OXYGEN SATURATION: 100 % | SYSTOLIC BLOOD PRESSURE: 103 MMHG | RESPIRATION RATE: 18 BRPM

## 2018-07-17 DIAGNOSIS — I70.213 ATHEROSCLEROSIS OF NATIVE ARTERIES OF EXTREMITIES WITH INTERMITTENT CLAUDICATION, BILATERAL LEGS (HCC): Primary | ICD-10-CM

## 2018-07-17 PROBLEM — I70.219 ATHEROSCLEROSIS WITH CLAUDICATION OF EXTREMITY (HCC): Status: ACTIVE | Noted: 2018-07-17

## 2018-07-17 LAB
ABO/RH: NORMAL
ANTIBODY SCREEN: NORMAL
POC ACT LR: 208 SEC
POC ACT LR: 268 SEC

## 2018-07-17 PROCEDURE — 6370000000 HC RX 637 (ALT 250 FOR IP): Performed by: SURGERY

## 2018-07-17 PROCEDURE — C1887 CATHETER, GUIDING: HCPCS | Performed by: SURGERY

## 2018-07-17 PROCEDURE — 2780000010 HC IMPLANT OTHER: Performed by: SURGERY

## 2018-07-17 PROCEDURE — C1894 INTRO/SHEATH, NON-LASER: HCPCS | Performed by: SURGERY

## 2018-07-17 PROCEDURE — 86900 BLOOD TYPING SEROLOGIC ABO: CPT

## 2018-07-17 PROCEDURE — 6360000002 HC RX W HCPCS: Performed by: NURSE ANESTHETIST, CERTIFIED REGISTERED

## 2018-07-17 PROCEDURE — C1769 GUIDE WIRE: HCPCS | Performed by: SURGERY

## 2018-07-17 PROCEDURE — 36415 COLL VENOUS BLD VENIPUNCTURE: CPT

## 2018-07-17 PROCEDURE — 2580000003 HC RX 258: Performed by: SURGERY

## 2018-07-17 PROCEDURE — 35583 VEIN BYP GRFT FEM-POPLITEAL: CPT | Performed by: SURGERY

## 2018-07-17 PROCEDURE — 86901 BLOOD TYPING SEROLOGIC RH(D): CPT

## 2018-07-17 PROCEDURE — 85347 COAGULATION TIME ACTIVATED: CPT

## 2018-07-17 PROCEDURE — 2709999900 HC NON-CHARGEABLE SUPPLY: Performed by: SURGERY

## 2018-07-17 PROCEDURE — 3600000005 HC SURGERY LEVEL 5 BASE: Performed by: SURGERY

## 2018-07-17 PROCEDURE — 3600000015 HC SURGERY LEVEL 5 ADDTL 15MIN: Performed by: SURGERY

## 2018-07-17 PROCEDURE — 2720000000 HC MISC SURG SUPPLY STERILE $0-50: Performed by: SURGERY

## 2018-07-17 PROCEDURE — 86850 RBC ANTIBODY SCREEN: CPT

## 2018-07-17 PROCEDURE — 06BQ0ZZ EXCISION OF LEFT SAPHENOUS VEIN, OPEN APPROACH: ICD-10-PCS | Performed by: SURGERY

## 2018-07-17 PROCEDURE — 7100000000 HC PACU RECOVERY - FIRST 15 MIN: Performed by: SURGERY

## 2018-07-17 PROCEDURE — 04CL0ZZ EXTIRPATION OF MATTER FROM LEFT FEMORAL ARTERY, OPEN APPROACH: ICD-10-PCS | Performed by: SURGERY

## 2018-07-17 PROCEDURE — 3700000000 HC ANESTHESIA ATTENDED CARE: Performed by: SURGERY

## 2018-07-17 PROCEDURE — 2580000003 HC RX 258: Performed by: ANESTHESIOLOGY

## 2018-07-17 PROCEDURE — 2500000003 HC RX 250 WO HCPCS: Performed by: NURSE ANESTHETIST, CERTIFIED REGISTERED

## 2018-07-17 PROCEDURE — 2580000003 HC RX 258: Performed by: NURSE ANESTHETIST, CERTIFIED REGISTERED

## 2018-07-17 PROCEDURE — B41GYZZ FLUOROSCOPY OF LEFT LOWER EXTREMITY ARTERIES USING OTHER CONTRAST: ICD-10-PCS | Performed by: SURGERY

## 2018-07-17 PROCEDURE — 6360000002 HC RX W HCPCS: Performed by: ANESTHESIOLOGY

## 2018-07-17 PROCEDURE — 94664 DEMO&/EVAL PT USE INHALER: CPT

## 2018-07-17 PROCEDURE — 6360000002 HC RX W HCPCS: Performed by: SURGERY

## 2018-07-17 PROCEDURE — 1210000000 HC MED SURG R&B

## 2018-07-17 PROCEDURE — 3700000001 HC ADD 15 MINUTES (ANESTHESIA): Performed by: SURGERY

## 2018-07-17 PROCEDURE — 041L09L BYPASS LEFT FEMORAL ARTERY TO POPLITEAL ARTERY WITH AUTOLOGOUS VENOUS TISSUE, OPEN APPROACH: ICD-10-PCS | Performed by: SURGERY

## 2018-07-17 PROCEDURE — C1726 CATH, BAL DIL, NON-VASCULAR: HCPCS | Performed by: SURGERY

## 2018-07-17 PROCEDURE — 6360000002 HC RX W HCPCS: Performed by: PHYSICIAN ASSISTANT

## 2018-07-17 PROCEDURE — 7100000001 HC PACU RECOVERY - ADDTL 15 MIN: Performed by: SURGERY

## 2018-07-17 DEVICE — VALVULOTOME ANGIOSCOPIC W/ CATH INTRO CUT HD TRUINCISE TIVK2030] TRUE INCISE]: Type: IMPLANTABLE DEVICE | Status: FUNCTIONAL

## 2018-07-17 RX ORDER — CLOPIDOGREL BISULFATE 75 MG/1
75 TABLET ORAL DAILY
Status: DISCONTINUED | OUTPATIENT
Start: 2018-07-17 | End: 2018-07-19 | Stop reason: HOSPADM

## 2018-07-17 RX ORDER — MEPERIDINE HYDROCHLORIDE 50 MG/ML
12.5 INJECTION INTRAMUSCULAR; INTRAVENOUS; SUBCUTANEOUS EVERY 5 MIN PRN
Status: DISCONTINUED | OUTPATIENT
Start: 2018-07-17 | End: 2018-07-17 | Stop reason: HOSPADM

## 2018-07-17 RX ORDER — HYDROMORPHONE HCL IN 0.9% NACL 0.5 MG/ML
0.25 SYRINGE (ML) INTRAVENOUS
Status: DISCONTINUED | OUTPATIENT
Start: 2018-07-17 | End: 2018-07-19 | Stop reason: HOSPADM

## 2018-07-17 RX ORDER — CLOPIDOGREL BISULFATE 75 MG/1
75 TABLET ORAL DAILY
Status: DISCONTINUED | OUTPATIENT
Start: 2018-07-17 | End: 2018-07-17

## 2018-07-17 RX ORDER — HYDROMORPHONE HCL IN 0.9% NACL 0.5 MG/ML
0.5 SYRINGE (ML) INTRAVENOUS
Status: DISCONTINUED | OUTPATIENT
Start: 2018-07-17 | End: 2018-07-19 | Stop reason: HOSPADM

## 2018-07-17 RX ORDER — SODIUM CHLORIDE 0.9 % (FLUSH) 0.9 %
10 SYRINGE (ML) INJECTION PRN
Status: DISCONTINUED | OUTPATIENT
Start: 2018-07-17 | End: 2018-07-17 | Stop reason: HOSPADM

## 2018-07-17 RX ORDER — LABETALOL HYDROCHLORIDE 5 MG/ML
5 INJECTION, SOLUTION INTRAVENOUS EVERY 10 MIN PRN
Status: DISCONTINUED | OUTPATIENT
Start: 2018-07-17 | End: 2018-07-17 | Stop reason: HOSPADM

## 2018-07-17 RX ORDER — ACETAMINOPHEN 325 MG/1
650 TABLET ORAL EVERY 4 HOURS PRN
Status: DISCONTINUED | OUTPATIENT
Start: 2018-07-17 | End: 2018-07-19 | Stop reason: HOSPADM

## 2018-07-17 RX ORDER — PROPOFOL 10 MG/ML
INJECTION, EMULSION INTRAVENOUS PRN
Status: DISCONTINUED | OUTPATIENT
Start: 2018-07-17 | End: 2018-07-17 | Stop reason: SDUPTHER

## 2018-07-17 RX ORDER — LIDOCAINE HYDROCHLORIDE 10 MG/ML
INJECTION, SOLUTION INFILTRATION; PERINEURAL PRN
Status: DISCONTINUED | OUTPATIENT
Start: 2018-07-17 | End: 2018-07-17 | Stop reason: SDUPTHER

## 2018-07-17 RX ORDER — HYDROMORPHONE HCL IN 0.9% NACL 0.5 MG/ML
0.5 SYRINGE (ML) INTRAVENOUS EVERY 5 MIN PRN
Status: DISCONTINUED | OUTPATIENT
Start: 2018-07-17 | End: 2018-07-17 | Stop reason: HOSPADM

## 2018-07-17 RX ORDER — FENTANYL CITRATE 50 UG/ML
25 INJECTION, SOLUTION INTRAMUSCULAR; INTRAVENOUS
Status: DISCONTINUED | OUTPATIENT
Start: 2018-07-17 | End: 2018-07-17 | Stop reason: HOSPADM

## 2018-07-17 RX ORDER — MORPHINE SULFATE 1 MG/ML
4 INJECTION, SOLUTION EPIDURAL; INTRATHECAL; INTRAVENOUS EVERY 5 MIN PRN
Status: DISCONTINUED | OUTPATIENT
Start: 2018-07-17 | End: 2018-07-17 | Stop reason: HOSPADM

## 2018-07-17 RX ORDER — ASPIRIN 81 MG/1
81 TABLET ORAL DAILY
Status: DISCONTINUED | OUTPATIENT
Start: 2018-07-18 | End: 2018-07-19 | Stop reason: HOSPADM

## 2018-07-17 RX ORDER — ONDANSETRON 2 MG/ML
4 INJECTION INTRAMUSCULAR; INTRAVENOUS EVERY 6 HOURS PRN
Status: DISCONTINUED | OUTPATIENT
Start: 2018-07-17 | End: 2018-07-19 | Stop reason: HOSPADM

## 2018-07-17 RX ORDER — HEPARIN SODIUM 1000 [USP'U]/ML
INJECTION, SOLUTION INTRAVENOUS; SUBCUTANEOUS PRN
Status: DISCONTINUED | OUTPATIENT
Start: 2018-07-17 | End: 2018-07-17 | Stop reason: SDUPTHER

## 2018-07-17 RX ORDER — ASPIRIN 81 MG/1
81 TABLET ORAL ONCE
Status: DISCONTINUED | OUTPATIENT
Start: 2018-07-17 | End: 2018-07-17 | Stop reason: HOSPADM

## 2018-07-17 RX ORDER — CLONIDINE HYDROCHLORIDE 0.1 MG/1
0.1 TABLET ORAL
Status: DISCONTINUED | OUTPATIENT
Start: 2018-07-17 | End: 2018-07-19 | Stop reason: HOSPADM

## 2018-07-17 RX ORDER — PROTAMINE SULFATE 10 MG/ML
INJECTION, SOLUTION INTRAVENOUS PRN
Status: DISCONTINUED | OUTPATIENT
Start: 2018-07-17 | End: 2018-07-17 | Stop reason: SDUPTHER

## 2018-07-17 RX ORDER — SODIUM CHLORIDE 0.9 % (FLUSH) 0.9 %
10 SYRINGE (ML) INJECTION EVERY 12 HOURS SCHEDULED
Status: DISCONTINUED | OUTPATIENT
Start: 2018-07-17 | End: 2018-07-17 | Stop reason: HOSPADM

## 2018-07-17 RX ORDER — HYDROCODONE BITARTRATE AND ACETAMINOPHEN 5; 325 MG/1; MG/1
1 TABLET ORAL EVERY 6 HOURS PRN
COMMUNITY
End: 2022-02-16 | Stop reason: CLARIF

## 2018-07-17 RX ORDER — HYDROCODONE BITARTRATE AND ACETAMINOPHEN 5; 325 MG/1; MG/1
1 TABLET ORAL EVERY 4 HOURS PRN
Status: DISCONTINUED | OUTPATIENT
Start: 2018-07-17 | End: 2018-07-19 | Stop reason: HOSPADM

## 2018-07-17 RX ORDER — SODIUM CHLORIDE 0.9 % (FLUSH) 0.9 %
10 SYRINGE (ML) INJECTION EVERY 12 HOURS SCHEDULED
Status: DISCONTINUED | OUTPATIENT
Start: 2018-07-17 | End: 2018-07-19 | Stop reason: HOSPADM

## 2018-07-17 RX ORDER — TAMSULOSIN HYDROCHLORIDE 0.4 MG/1
0.4 CAPSULE ORAL DAILY
Status: DISCONTINUED | OUTPATIENT
Start: 2018-07-17 | End: 2018-07-19 | Stop reason: HOSPADM

## 2018-07-17 RX ORDER — HYDROMORPHONE HCL IN 0.9% NACL 0.5 MG/ML
0.25 SYRINGE (ML) INTRAVENOUS EVERY 5 MIN PRN
Status: DISCONTINUED | OUTPATIENT
Start: 2018-07-17 | End: 2018-07-17 | Stop reason: HOSPADM

## 2018-07-17 RX ORDER — SODIUM CHLORIDE 9 MG/ML
INJECTION, SOLUTION INTRAVENOUS CONTINUOUS
Status: ACTIVE | OUTPATIENT
Start: 2018-07-17 | End: 2018-07-17

## 2018-07-17 RX ORDER — DIPHENHYDRAMINE HYDROCHLORIDE 50 MG/ML
12.5 INJECTION INTRAMUSCULAR; INTRAVENOUS
Status: DISCONTINUED | OUTPATIENT
Start: 2018-07-17 | End: 2018-07-17 | Stop reason: HOSPADM

## 2018-07-17 RX ORDER — NICOTINE 21 MG/24HR
1 PATCH, TRANSDERMAL 24 HOURS TRANSDERMAL DAILY
Status: DISCONTINUED | OUTPATIENT
Start: 2018-07-17 | End: 2018-07-19 | Stop reason: HOSPADM

## 2018-07-17 RX ORDER — MIDAZOLAM HYDROCHLORIDE 1 MG/ML
2 INJECTION INTRAMUSCULAR; INTRAVENOUS
Status: COMPLETED | OUTPATIENT
Start: 2018-07-17 | End: 2018-07-17

## 2018-07-17 RX ORDER — DEXAMETHASONE SODIUM PHOSPHATE 10 MG/ML
INJECTION INTRAMUSCULAR; INTRAVENOUS PRN
Status: DISCONTINUED | OUTPATIENT
Start: 2018-07-17 | End: 2018-07-17 | Stop reason: SDUPTHER

## 2018-07-17 RX ORDER — ASPIRIN 81 MG/1
81 TABLET ORAL DAILY
Status: DISCONTINUED | OUTPATIENT
Start: 2018-07-17 | End: 2018-07-17 | Stop reason: SDUPTHER

## 2018-07-17 RX ORDER — FENTANYL CITRATE 50 UG/ML
50 INJECTION, SOLUTION INTRAMUSCULAR; INTRAVENOUS
Status: DISCONTINUED | OUTPATIENT
Start: 2018-07-17 | End: 2018-07-17 | Stop reason: HOSPADM

## 2018-07-17 RX ORDER — MORPHINE SULFATE 1 MG/ML
2 INJECTION, SOLUTION EPIDURAL; INTRATHECAL; INTRAVENOUS EVERY 5 MIN PRN
Status: DISCONTINUED | OUTPATIENT
Start: 2018-07-17 | End: 2018-07-17 | Stop reason: HOSPADM

## 2018-07-17 RX ORDER — SODIUM CHLORIDE, SODIUM LACTATE, POTASSIUM CHLORIDE, CALCIUM CHLORIDE 600; 310; 30; 20 MG/100ML; MG/100ML; MG/100ML; MG/100ML
INJECTION, SOLUTION INTRAVENOUS CONTINUOUS
Status: DISCONTINUED | OUTPATIENT
Start: 2018-07-17 | End: 2018-07-17

## 2018-07-17 RX ORDER — HYDRALAZINE HYDROCHLORIDE 20 MG/ML
5 INJECTION INTRAMUSCULAR; INTRAVENOUS EVERY 10 MIN PRN
Status: DISCONTINUED | OUTPATIENT
Start: 2018-07-17 | End: 2018-07-17 | Stop reason: HOSPADM

## 2018-07-17 RX ORDER — MORPHINE SULFATE 10 MG/ML
INJECTION, SOLUTION INTRAMUSCULAR; INTRAVENOUS PRN
Status: DISCONTINUED | OUTPATIENT
Start: 2018-07-17 | End: 2018-07-17 | Stop reason: SDUPTHER

## 2018-07-17 RX ORDER — ROCURONIUM BROMIDE 10 MG/ML
INJECTION, SOLUTION INTRAVENOUS PRN
Status: DISCONTINUED | OUTPATIENT
Start: 2018-07-17 | End: 2018-07-17 | Stop reason: SDUPTHER

## 2018-07-17 RX ORDER — FENTANYL CITRATE 50 UG/ML
INJECTION, SOLUTION INTRAMUSCULAR; INTRAVENOUS PRN
Status: DISCONTINUED | OUTPATIENT
Start: 2018-07-17 | End: 2018-07-17 | Stop reason: SDUPTHER

## 2018-07-17 RX ORDER — PANTOPRAZOLE SODIUM 40 MG/1
40 GRANULE, DELAYED RELEASE ORAL
Status: DISCONTINUED | OUTPATIENT
Start: 2018-07-18 | End: 2018-07-17 | Stop reason: SDUPTHER

## 2018-07-17 RX ORDER — BUSPIRONE HYDROCHLORIDE 15 MG/1
15 TABLET ORAL 2 TIMES DAILY
Status: DISCONTINUED | OUTPATIENT
Start: 2018-07-17 | End: 2018-07-19 | Stop reason: HOSPADM

## 2018-07-17 RX ORDER — SODIUM CHLORIDE 0.9 % (FLUSH) 0.9 %
10 SYRINGE (ML) INJECTION PRN
Status: DISCONTINUED | OUTPATIENT
Start: 2018-07-17 | End: 2018-07-19 | Stop reason: HOSPADM

## 2018-07-17 RX ORDER — HYDROCODONE BITARTRATE AND ACETAMINOPHEN 5; 325 MG/1; MG/1
2 TABLET ORAL EVERY 4 HOURS PRN
Status: DISCONTINUED | OUTPATIENT
Start: 2018-07-17 | End: 2018-07-19 | Stop reason: HOSPADM

## 2018-07-17 RX ORDER — ONDANSETRON 2 MG/ML
INJECTION INTRAMUSCULAR; INTRAVENOUS PRN
Status: DISCONTINUED | OUTPATIENT
Start: 2018-07-17 | End: 2018-07-17 | Stop reason: SDUPTHER

## 2018-07-17 RX ORDER — ATORVASTATIN CALCIUM 20 MG/1
20 TABLET, FILM COATED ORAL NIGHTLY
Status: DISCONTINUED | OUTPATIENT
Start: 2018-07-17 | End: 2018-07-19 | Stop reason: HOSPADM

## 2018-07-17 RX ORDER — LIDOCAINE HYDROCHLORIDE 10 MG/ML
1 INJECTION, SOLUTION EPIDURAL; INFILTRATION; INTRACAUDAL; PERINEURAL
Status: DISCONTINUED | OUTPATIENT
Start: 2018-07-17 | End: 2018-07-17 | Stop reason: HOSPADM

## 2018-07-17 RX ORDER — POLYETHYLENE GLYCOL 3350 17 G/17G
17 POWDER, FOR SOLUTION ORAL DAILY
Status: DISCONTINUED | OUTPATIENT
Start: 2018-07-17 | End: 2018-07-19 | Stop reason: HOSPADM

## 2018-07-17 RX ORDER — SODIUM CHLORIDE, SODIUM LACTATE, POTASSIUM CHLORIDE, CALCIUM CHLORIDE 600; 310; 30; 20 MG/100ML; MG/100ML; MG/100ML; MG/100ML
INJECTION, SOLUTION INTRAVENOUS CONTINUOUS PRN
Status: DISCONTINUED | OUTPATIENT
Start: 2018-07-17 | End: 2018-07-17 | Stop reason: SDUPTHER

## 2018-07-17 RX ORDER — CITALOPRAM 20 MG/1
20 TABLET ORAL DAILY
Status: DISCONTINUED | OUTPATIENT
Start: 2018-07-17 | End: 2018-07-19 | Stop reason: HOSPADM

## 2018-07-17 RX ORDER — GABAPENTIN 300 MG/1
300 CAPSULE ORAL NIGHTLY
Status: DISCONTINUED | OUTPATIENT
Start: 2018-07-17 | End: 2018-07-19 | Stop reason: HOSPADM

## 2018-07-17 RX ORDER — ENALAPRILAT 2.5 MG/2ML
1.25 INJECTION INTRAVENOUS
Status: DISCONTINUED | OUTPATIENT
Start: 2018-07-17 | End: 2018-07-17 | Stop reason: HOSPADM

## 2018-07-17 RX ORDER — ONDANSETRON 4 MG/1
4 TABLET, ORALLY DISINTEGRATING ORAL EVERY 8 HOURS PRN
Status: DISCONTINUED | OUTPATIENT
Start: 2018-07-17 | End: 2018-07-19 | Stop reason: HOSPADM

## 2018-07-17 RX ORDER — METOCLOPRAMIDE HYDROCHLORIDE 5 MG/ML
10 INJECTION INTRAMUSCULAR; INTRAVENOUS
Status: DISCONTINUED | OUTPATIENT
Start: 2018-07-17 | End: 2018-07-17 | Stop reason: HOSPADM

## 2018-07-17 RX ORDER — PANTOPRAZOLE SODIUM 40 MG/1
40 TABLET, DELAYED RELEASE ORAL
Status: DISCONTINUED | OUTPATIENT
Start: 2018-07-18 | End: 2018-07-19 | Stop reason: HOSPADM

## 2018-07-17 RX ORDER — LISINOPRIL 20 MG/1
20 TABLET ORAL DAILY
Status: DISCONTINUED | OUTPATIENT
Start: 2018-07-17 | End: 2018-07-19 | Stop reason: HOSPADM

## 2018-07-17 RX ORDER — SUCCINYLCHOLINE CHLORIDE 20 MG/ML
INJECTION INTRAMUSCULAR; INTRAVENOUS PRN
Status: DISCONTINUED | OUTPATIENT
Start: 2018-07-17 | End: 2018-07-17 | Stop reason: SDUPTHER

## 2018-07-17 RX ORDER — PROMETHAZINE HYDROCHLORIDE 25 MG/ML
6.25 INJECTION, SOLUTION INTRAMUSCULAR; INTRAVENOUS
Status: DISCONTINUED | OUTPATIENT
Start: 2018-07-17 | End: 2018-07-17 | Stop reason: HOSPADM

## 2018-07-17 RX ADMIN — SODIUM CHLORIDE, SODIUM LACTATE, POTASSIUM CHLORIDE, AND CALCIUM CHLORIDE: 600; 310; 30; 20 INJECTION, SOLUTION INTRAVENOUS at 13:28

## 2018-07-17 RX ADMIN — GABAPENTIN 300 MG: 300 CAPSULE ORAL at 22:54

## 2018-07-17 RX ADMIN — Medication 0.5 MG: at 16:47

## 2018-07-17 RX ADMIN — FENTANYL CITRATE 50 MCG: 50 INJECTION INTRAMUSCULAR; INTRAVENOUS at 11:32

## 2018-07-17 RX ADMIN — LIDOCAINE HYDROCHLORIDE 40 MG: 10 INJECTION, SOLUTION INFILTRATION; PERINEURAL at 10:03

## 2018-07-17 RX ADMIN — PHENYLEPHRINE HYDROCHLORIDE 80 MCG: 10 INJECTION INTRAVENOUS at 10:14

## 2018-07-17 RX ADMIN — Medication 4 MG: at 14:44

## 2018-07-17 RX ADMIN — SODIUM CHLORIDE, SODIUM LACTATE, POTASSIUM CHLORIDE, AND CALCIUM CHLORIDE: 600; 310; 30; 20 INJECTION, SOLUTION INTRAVENOUS at 09:03

## 2018-07-17 RX ADMIN — SODIUM CHLORIDE, SODIUM LACTATE, POTASSIUM CHLORIDE, AND CALCIUM CHLORIDE: 600; 310; 30; 20 INJECTION, SOLUTION INTRAVENOUS at 09:31

## 2018-07-17 RX ADMIN — Medication 2 G: at 17:23

## 2018-07-17 RX ADMIN — Medication 120 MG: at 10:03

## 2018-07-17 RX ADMIN — MIDAZOLAM 2 MG: 1 INJECTION INTRAMUSCULAR; INTRAVENOUS at 09:45

## 2018-07-17 RX ADMIN — HEPARIN SODIUM 6000 UNITS: 1000 INJECTION, SOLUTION INTRAVENOUS; SUBCUTANEOUS at 11:33

## 2018-07-17 RX ADMIN — Medication 10 ML: at 22:54

## 2018-07-17 RX ADMIN — FENTANYL CITRATE 50 MCG: 50 INJECTION INTRAMUSCULAR; INTRAVENOUS at 13:20

## 2018-07-17 RX ADMIN — FENTANYL CITRATE 50 MCG: 50 INJECTION INTRAMUSCULAR; INTRAVENOUS at 12:27

## 2018-07-17 RX ADMIN — ONDANSETRON HYDROCHLORIDE 4 MG: 2 INJECTION, SOLUTION INTRAMUSCULAR; INTRAVENOUS at 13:35

## 2018-07-17 RX ADMIN — PROTAMINE SULFATE 30 MG: 10 INJECTION, SOLUTION INTRAVENOUS at 13:25

## 2018-07-17 RX ADMIN — HEPARIN SODIUM 1000 UNITS: 1000 INJECTION, SOLUTION INTRAVENOUS; SUBCUTANEOUS at 12:33

## 2018-07-17 RX ADMIN — ROCURONIUM BROMIDE 5 MG: 10 INJECTION INTRAVENOUS at 10:03

## 2018-07-17 RX ADMIN — MORPHINE SULFATE 2 MG: 10 INJECTION INTRAMUSCULAR; INTRAVENOUS; SUBCUTANEOUS at 13:35

## 2018-07-17 RX ADMIN — ROCURONIUM BROMIDE 10 MG: 10 INJECTION INTRAVENOUS at 12:04

## 2018-07-17 RX ADMIN — MORPHINE SULFATE 4 MG: 10 INJECTION INTRAMUSCULAR; INTRAVENOUS; SUBCUTANEOUS at 13:10

## 2018-07-17 RX ADMIN — BUSPIRONE HYDROCHLORIDE 15 MG: 15 TABLET ORAL at 22:54

## 2018-07-17 RX ADMIN — HEPARIN SODIUM 1000 UNITS: 1000 INJECTION, SOLUTION INTRAVENOUS; SUBCUTANEOUS at 12:37

## 2018-07-17 RX ADMIN — PROPOFOL 180 MG: 10 INJECTION, EMULSION INTRAVENOUS at 10:03

## 2018-07-17 RX ADMIN — SUGAMMADEX 146 MG: 100 INJECTION, SOLUTION INTRAVENOUS at 13:58

## 2018-07-17 RX ADMIN — MORPHINE SULFATE 2 MG: 10 INJECTION INTRAMUSCULAR; INTRAVENOUS; SUBCUTANEOUS at 13:29

## 2018-07-17 RX ADMIN — ROCURONIUM BROMIDE 10 MG: 10 INJECTION INTRAVENOUS at 13:28

## 2018-07-17 RX ADMIN — MORPHINE SULFATE 2 MG: 10 INJECTION INTRAMUSCULAR; INTRAVENOUS; SUBCUTANEOUS at 13:20

## 2018-07-17 RX ADMIN — SODIUM CHLORIDE: 9 INJECTION, SOLUTION INTRAVENOUS at 16:41

## 2018-07-17 RX ADMIN — ATORVASTATIN CALCIUM 20 MG: 20 TABLET, FILM COATED ORAL at 22:55

## 2018-07-17 RX ADMIN — SODIUM CHLORIDE, SODIUM LACTATE, POTASSIUM CHLORIDE, AND CALCIUM CHLORIDE: 600; 310; 30; 20 INJECTION, SOLUTION INTRAVENOUS at 09:57

## 2018-07-17 RX ADMIN — ROCURONIUM BROMIDE 20 MG: 10 INJECTION INTRAVENOUS at 11:25

## 2018-07-17 RX ADMIN — SODIUM CHLORIDE, SODIUM LACTATE, POTASSIUM CHLORIDE, AND CALCIUM CHLORIDE: 600; 310; 30; 20 INJECTION, SOLUTION INTRAVENOUS at 11:30

## 2018-07-17 RX ADMIN — DEXAMETHASONE SODIUM PHOSPHATE 10 MG: 10 INJECTION INTRAMUSCULAR; INTRAVENOUS at 10:09

## 2018-07-17 RX ADMIN — FENTANYL CITRATE 50 MCG: 50 INJECTION INTRAMUSCULAR; INTRAVENOUS at 12:55

## 2018-07-17 RX ADMIN — Medication 2 G: at 10:10

## 2018-07-17 RX ADMIN — FENTANYL CITRATE 50 MCG: 50 INJECTION INTRAMUSCULAR; INTRAVENOUS at 10:40

## 2018-07-17 RX ADMIN — FENTANYL CITRATE 100 MCG: 50 INJECTION INTRAMUSCULAR; INTRAVENOUS at 10:03

## 2018-07-17 RX ADMIN — CLOPIDOGREL BISULFATE 75 MG: 75 TABLET ORAL at 22:54

## 2018-07-17 ASSESSMENT — PAIN DESCRIPTION - ORIENTATION
ORIENTATION: LEFT
ORIENTATION: LEFT

## 2018-07-17 ASSESSMENT — PAIN SCALES - GENERAL
PAINLEVEL_OUTOF10: 0
PAINLEVEL_OUTOF10: 9
PAINLEVEL_OUTOF10: 0
PAINLEVEL_OUTOF10: 7
PAINLEVEL_OUTOF10: 0
PAINLEVEL_OUTOF10: 0
PAINLEVEL_OUTOF10: 9
PAINLEVEL_OUTOF10: 0
PAINLEVEL_OUTOF10: 2
PAINLEVEL_OUTOF10: 4
PAINLEVEL_OUTOF10: 0

## 2018-07-17 ASSESSMENT — PAIN DESCRIPTION - PAIN TYPE
TYPE: SURGICAL PAIN
TYPE: SURGICAL PAIN

## 2018-07-17 ASSESSMENT — PAIN SCALES - WONG BAKER
WONGBAKER_NUMERICALRESPONSE: 0
WONGBAKER_NUMERICALRESPONSE: 0

## 2018-07-17 ASSESSMENT — PAIN DESCRIPTION - LOCATION
LOCATION: LEG
LOCATION: LEG

## 2018-07-17 ASSESSMENT — LIFESTYLE VARIABLES: SMOKING_STATUS: 1

## 2018-07-17 NOTE — PROGRESS NOTES
Patient arrived to room 333 from PACU. Mepilex dressing to left groin, left knee, and left calf. Patient rating pain 9/10. Family at bedside. Fam catheter to bedside drainage-clear, yellow. Will continue to monitor.

## 2018-07-17 NOTE — ANESTHESIA PRE PROCEDURE
Department of Anesthesiology  Preprocedure Note       Name:  True Marquez   Age:  74 y.o.  :  1950                                          MRN:  [id number]         Date:  2018      Surgeon: Turner Barthel):  Cha Brizuela MD    Procedure: Procedure(s): FEMORAL POPLITEAL BYPASS WITH VEIN    Medications prior to admission:   Prior to Admission medications    Medication Sig Start Date End Date Taking? Authorizing Provider   gabapentin (NEURONTIN) 300 MG capsule Take 300 mg by mouth nightly. Waldemar Arzate Historical Provider, MD   nicotine (NICODERM CQ) 21 MG/24HR Place 1 patch onto the skin every 24 hours    Historical Provider, MD   HYDROcodone-acetaminophen (NORCO) 5-325 MG per tablet Take 1 tablet by mouth every 6 hours as needed for Pain. Waldemar Arzate     Historical Provider, MD   ondansetron (ZOFRAN-ODT) 4 MG disintegrating tablet Take 4 mg by mouth every 8 hours as needed for Nausea or Vomiting    Historical Provider, MD   lisinopril (PRINIVIL;ZESTRIL) 20 MG tablet Take 20 mg by mouth daily    Historical Provider, MD   Wheat Dextrin (EQ FIBER POWDER PO) Take by mouth daily    Historical Provider, MD   tamsulosin (FLOMAX) 0.4 MG capsule Take 0.4 mg by mouth daily    Historical Provider, MD   pantoprazole sodium (PROTONIX) 40 MG PACK packet Take 40 mg by mouth every morning (before breakfast)    Historical Provider, MD   Naproxen Sodium (ALEVE) 220 MG CAPS Take by mouth every 4 hours as needed     Historical Provider, MD   Potassium 99 MG TABS Take by mouth daily     Historical Provider, MD   acetaminophen (TYLENOL) 325 MG tablet Take 650 mg by mouth every 6 hours as needed for Pain    Historical Provider, MD   clopidogrel (PLAVIX) 75 MG tablet TAKE 1 TABLET BY MOUTH DAILY 18   Rain Paulson PA-C   polyethylene glycol (MIRALAX) powder Take 17 g by mouth daily    Historical Provider, MD   omeprazole (PRILOSEC) 20 MG delayed release capsule Take 20 mg by mouth daily    Historical Provider, MD   busPIRone (BUSPAR) 15 MG 52   Resp: 18   Temp: 97.6 °F (36.4 °C)   TempSrc: Temporal   SpO2: 100%                                              BP Readings from Last 3 Encounters:   07/17/18 (!) 143/74   06/04/18 120/70   05/16/18 (!) 154/76       NPO Status:                                                                                 BMI:   Wt Readings from Last 3 Encounters:   07/05/18 161 lb (73 kg)   05/16/18 163 lb (73.9 kg)   08/02/17 159 lb (72.1 kg)     There is no height or weight on file to calculate BMI.    CBC:   Lab Results   Component Value Date    WBC 10.4 07/05/2018    RBC 4.69 07/05/2018    HGB 14.4 07/05/2018    HCT 44.0 07/05/2018    MCV 93.8 07/05/2018    RDW 13.4 07/05/2018     07/05/2018       CMP:   Lab Results   Component Value Date     07/05/2018    K 4.3 07/05/2018     07/05/2018    CO2 25 07/05/2018    BUN 15 07/05/2018    CREATININE 1.1 07/05/2018    LABGLOM >60 07/05/2018    GLUCOSE 75 07/05/2018    PROT 7.6 01/16/2015    CALCIUM 9.8 07/05/2018    ALKPHOS 114 01/16/2015    AST 19 01/16/2015    ALT 17 01/16/2015       POC Tests: No results for input(s): POCGLU, POCNA, POCK, POCCL, POCBUN, POCHEMO, POCHCT in the last 72 hours. Coags:   Lab Results   Component Value Date    PROTIME 11.9 07/05/2018    INR 0.89 07/05/2018    APTT 26.1 07/05/2018       HCG (If Applicable): No results found for: PREGTESTUR, PREGSERUM, HCG, HCGQUANT     ABGs: No results found for: PHART, PO2ART, MRY7OTJ, WCN1HHO, BEART, Z5FZUXIU     Type & Screen (If Applicable):  No results found for: LABABO, [address]    Anesthesia Evaluation  Patient summary reviewed and Nursing notes reviewed no history of anesthetic complications:   Airway: Mallampati: I  TM distance: >3 FB   Neck ROM: full  Mouth opening: > = 3 FB Dental:          Pulmonary:   (+) COPD:  current smoker          Patient smoked on day of surgery.                  Cardiovascular:    (+) hypertension:,     (-) pacemaker, CAD, CABG/stent and dysrhythmias       Beta Blocker:  Not on Beta Blocker         Neuro/Psych:   (+) psychiatric history:            GI/Hepatic/Renal:   (+) GERD: well controlled,           Endo/Other:    (+) blood dyscrasia: anticoagulation therapy:., .                 Abdominal:           Vascular:                                        Anesthesia Plan      general     ASA 3     (Decadron/Zofran Intraop)  Induction: intravenous. arterial line  MIPS: Postoperative opioids intended and Prophylactic antiemetics administered. Anesthetic plan and risks discussed with patient.                       Francis Bhatti MD   7/17/2018

## 2018-07-17 NOTE — H&P (VIEW-ONLY)
Patient Care Team:  Orlin Garay as PCP - Sendy Santillan MD as Consulting Physician (Vascular Surgery)      History and Physical    Mr. José Miguel Sargent has a history of peripheral vascular disease of the lower extremities. He has had this for 1 - 5 years. Current treatment includes a statin,  clopidogrel 75 mg po qd, ASA EC daily. Dianne Cam has not had new wounds. Recently, he reports claudication at a distance of approximately 20-25 feet . He reports that the left  leg is worse than the right. He reports claudication is mostly in the form of crampy type pain starting in the calves. He has a short recovery time. This is reproducible in nature. He reports ischemic rest pain 0 times per night. He is still smoking 1/2 -1 PPD. He comes in today to discuss results of the A-gram and possible options. He is taking the Pletal as prescribed. He reports a little dizziness and reports that his BP has been running a little low.      Chad Lloyd is a 74 y.o. male with the following history reviewed and recorded in [facility]:  Patient Active Problem List    Diagnosis Date Noted    Atherosclerosis of native arteries of extremities with intermittent claudication, bilateral legs ([facility] 09/23/2016    Hyperlipidemia     Hypertension      Current Outpatient Prescriptions   Medication Sig Dispense Refill    cilostazol (PLETAL) 100 MG tablet Take 0.5 tablets by mouth 2 times daily 60 tablet 5    pantoprazole sodium (PROTONIX) 40 MG PACK packet Take 40 mg by mouth every morning (before breakfast)      Naproxen Sodium (ALEVE) 220 MG CAPS Take by mouth every 4 hours as needed       Potassium 99 MG TABS Take by mouth      acetaminophen (TYLENOL) 325 MG tablet Take 650 mg by mouth every 6 hours as needed for Pain      clopidogrel (PLAVIX) 75 MG tablet TAKE 1 TABLET BY MOUTH DAILY 90 tablet 0    losartan (COZAAR) 50 MG tablet Take 50 mg by mouth daily       polyethylene glycol (MIRALAX) powder Take 17 g by mouth daily      arteriograms, right ROSEMARIE u/s guided 4f sheath, balloon angioplasty right SFA/Pop occulsion with two 5x100 desiree,Right SFA/pop balloon angioplasty 6x200 desiree, right SFA/pop stents (supera 5.5x150, 5.5x60),Right SFA/pop stents balloon angioplasty 6x150 lutonix, right lower arteriograms, right SFA stent (epic 7x40), right lower arteriograms    VASCULAR SURGERY  01/11/2017    [facility]-CONT-Left CFA proglide closure    VASCULAR SURGERY  05/16/2018    [facility]- Right CFA 5f sheath, aortogram with bilateral lower arteriogram, Mynx right CFA    VASCULAR SURGERY Right 05/17/18 [facility]    1. Percutaneous cannulation right common femoral artery with 5 Kinyarwanda glide sheath. 2. Suprarenal abdominal aortogram with bilateral iliofemoralarteriogram. 3. Bilateral lower extremity arteriogram. 4. MYNX closure right common femoral artery puncturesite. Family History   Problem Relation Age of Onset    Diabetes Mother     Heart Disease Mother     Stroke Father      Social History   Substance Use Topics    Smoking status: Current Every Day Smoker     Packs/day: 2.00     Years: 20.00     Types: Cigarettes    Smokeless tobacco: Never Used    Alcohol use Yes      Comment: occ         Review of Systems    Constitutional  no significant activity change, appetite change, or unexpected weight change. No fever or chills. No diaphoresis or significant fatigue. HENT  no significant rhinorrhea or epistaxis. No tinnitus or significant hearing loss. Eyes  no sudden vision change or amaurosis. Respiratory  no significant shortness of breath, wheezing, or stridor. No apnea, cough, or chest tightness associated with shortness of breath. Cardiovascular  no chest pain, syncope, or significant dizziness. No palpitations or significant leg swelling. Patient reports has worsening claudication. Gastrointestinal  no c/o voiced  Genitourinary  No difficulty urinating, dysuria, frequency, or urgency.   No flank pain or

## 2018-07-17 NOTE — PLAN OF CARE
Problem: Pain:  Goal: Pain level will decrease  Pain level will decrease   Outcome: Ongoing    Goal: Control of acute pain  Control of acute pain   Outcome: Ongoing    Goal: Control of chronic pain  Control of chronic pain   Outcome: Ongoing      Problem: Falls - Risk of:  Goal: Will remain free from falls  Will remain free from falls   Outcome: Ongoing    Goal: Absence of physical injury  Absence of physical injury   Outcome: Ongoing      Problem: ABCDS Injury Assessment  Goal: Absence of physical injury  Outcome: Ongoing

## 2018-07-18 LAB
ANION GAP SERPL CALCULATED.3IONS-SCNC: 10 MMOL/L (ref 7–19)
BUN BLDV-MCNC: 15 MG/DL (ref 8–23)
CALCIUM SERPL-MCNC: 8.3 MG/DL (ref 8.8–10.2)
CHLORIDE BLD-SCNC: 104 MMOL/L (ref 98–111)
CO2: 23 MMOL/L (ref 22–29)
CREAT SERPL-MCNC: 1.2 MG/DL (ref 0.5–1.2)
GFR NON-AFRICAN AMERICAN: >60
GLUCOSE BLD-MCNC: 143 MG/DL (ref 74–109)
HCT VFR BLD CALC: 32.8 % (ref 42–52)
HEMOGLOBIN: 11 G/DL (ref 14–18)
MCH RBC QN AUTO: 31.1 PG (ref 27–31)
MCHC RBC AUTO-ENTMCNC: 33.5 G/DL (ref 33–37)
MCV RBC AUTO: 92.7 FL (ref 80–94)
PDW BLD-RTO: 12.9 % (ref 11.5–14.5)
PLATELET # BLD: 258 K/UL (ref 130–400)
PMV BLD AUTO: 10.2 FL (ref 9.4–12.4)
POTASSIUM REFLEX MAGNESIUM: 3.9 MMOL/L (ref 3.5–5)
RBC # BLD: 3.54 M/UL (ref 4.7–6.1)
SODIUM BLD-SCNC: 137 MMOL/L (ref 136–145)
WBC # BLD: 15.9 K/UL (ref 4.8–10.8)

## 2018-07-18 PROCEDURE — 2580000003 HC RX 258: Performed by: SURGERY

## 2018-07-18 PROCEDURE — 99024 POSTOP FOLLOW-UP VISIT: CPT | Performed by: SURGERY

## 2018-07-18 PROCEDURE — 85027 COMPLETE CBC AUTOMATED: CPT

## 2018-07-18 PROCEDURE — 6370000000 HC RX 637 (ALT 250 FOR IP): Performed by: SURGERY

## 2018-07-18 PROCEDURE — 6360000002 HC RX W HCPCS: Performed by: SURGERY

## 2018-07-18 PROCEDURE — 36415 COLL VENOUS BLD VENIPUNCTURE: CPT

## 2018-07-18 PROCEDURE — 1210000000 HC MED SURG R&B

## 2018-07-18 PROCEDURE — 80048 BASIC METABOLIC PNL TOTAL CA: CPT

## 2018-07-18 RX ADMIN — CITALOPRAM HYDROBROMIDE 20 MG: 20 TABLET ORAL at 08:48

## 2018-07-18 RX ADMIN — GABAPENTIN 300 MG: 300 CAPSULE ORAL at 20:55

## 2018-07-18 RX ADMIN — BUSPIRONE HYDROCHLORIDE 15 MG: 15 TABLET ORAL at 08:48

## 2018-07-18 RX ADMIN — TAMSULOSIN HYDROCHLORIDE 0.4 MG: 0.4 CAPSULE ORAL at 08:48

## 2018-07-18 RX ADMIN — ASPIRIN 81 MG: 81 TABLET, COATED ORAL at 08:47

## 2018-07-18 RX ADMIN — BUSPIRONE HYDROCHLORIDE 15 MG: 15 TABLET ORAL at 20:55

## 2018-07-18 RX ADMIN — PANTOPRAZOLE SODIUM 40 MG: 40 TABLET, DELAYED RELEASE ORAL at 06:34

## 2018-07-18 RX ADMIN — Medication 10 ML: at 21:00

## 2018-07-18 RX ADMIN — Medication 2 G: at 02:04

## 2018-07-18 RX ADMIN — CLOPIDOGREL BISULFATE 75 MG: 75 TABLET ORAL at 08:48

## 2018-07-18 RX ADMIN — ATORVASTATIN CALCIUM 20 MG: 20 TABLET, FILM COATED ORAL at 20:56

## 2018-07-18 RX ADMIN — HYDROCODONE BITARTRATE AND ACETAMINOPHEN 1 TABLET: 5; 325 TABLET ORAL at 20:55

## 2018-07-18 RX ADMIN — LISINOPRIL 20 MG: 20 TABLET ORAL at 08:48

## 2018-07-18 RX ADMIN — POLYETHYLENE GLYCOL 3350 17 G: 17 POWDER, FOR SOLUTION ORAL at 08:48

## 2018-07-18 ASSESSMENT — PAIN DESCRIPTION - PAIN TYPE
TYPE: SURGICAL PAIN
TYPE: SURGICAL PAIN

## 2018-07-18 ASSESSMENT — PAIN DESCRIPTION - ORIENTATION
ORIENTATION: LEFT
ORIENTATION: LEFT

## 2018-07-18 ASSESSMENT — PAIN DESCRIPTION - LOCATION
LOCATION: LEG
LOCATION: LEG

## 2018-07-18 ASSESSMENT — PAIN SCALES - GENERAL
PAINLEVEL_OUTOF10: 2
PAINLEVEL_OUTOF10: 6

## 2018-07-18 NOTE — PROGRESS NOTES
ml   Output             4400 ml   Net             -222 ml       In: 2178 [P.O.:800; I.V.:1378]  Out: 3500 [Urine:3500]    I/O last 3 completed shifts: In: 8696 [P.O.:800; I.V.:3378]  Out: 3400 [Urine:3000; Blood:400]       Date 07/18/18 0000 - 07/18/18 2359   Shift 5416-7536 8808-6727 0346-8056 24 Hour Total   I  N  T  A  K  E   P.O.  (mL/kg/hr) 400  (0.7)   400    I.V.  (mL/kg) 753  (10.1)   753  (10.1)    Shift Total  (mL/kg) 1153  (15.4)   1153  (15.4)   O  U  T  P  U  T   Urine  (mL/kg/hr) 1550  (2.6) 1000  2550    Shift Total  (mL/kg) 1550  (20.7) 1000  (13.4)  2550  (34.1)   Weight (kg) 74.8 74.8 74.8 74.8       Wt Readings from Last 3 Encounters:   07/17/18 165 lb (74.8 kg)   07/05/18 161 lb (73 kg)   05/16/18 163 lb (73.9 kg)      Body mass index is 23.68 kg/m². Diet: DIET GENERAL;    MEDS:     Scheduled Meds:   aspirin  81 mg Oral Daily    atorvastatin  20 mg Oral Nightly    busPIRone  15 mg Oral BID    citalopram  20 mg Oral Daily    gabapentin  300 mg Oral Nightly    lisinopril  20 mg Oral Daily    nicotine  1 patch Transdermal Daily    pantoprazole  40 mg Oral QAM AC    polyethylene glycol  17 g Oral Daily    tamsulosin  0.4 mg Oral Daily    sodium chloride flush  10 mL Intravenous 2 times per day    clopidogrel  75 mg Oral Daily     Continuous Infusions:  PRN Meds:  ondansetron 4 mg Q8H PRN   sodium chloride flush 10 mL PRN   acetaminophen 650 mg Q4H PRN   HYDROcodone 5 mg - acetaminophen 1 tablet Q4H PRN   Or     HYDROcodone 5 mg - acetaminophen 2 tablet Q4H PRN   ondansetron 4 mg Q6H PRN   metoprolol tartrate 25 mg Q12H PRN   cloNIDine 0.1 mg Q2H PRN   HYDROmorphone 0.25 mg Q3H PRN   Or     HYDROmorphone 0.5 mg Q3H PRN     PHYSICAL EXAM:     CONSTITUTIONAL: Alert and oriented times 3, no acute distress and cooperative to examination. LUNGS: Chest expands equally bilaterally upon respiration, no accessory muscle used. Ausculation reveals no wheezes, rales or rhonchi.   CARDIOVASCULAR:

## 2018-07-19 VITALS
RESPIRATION RATE: 18 BRPM | OXYGEN SATURATION: 96 % | WEIGHT: 165 LBS | HEART RATE: 80 BPM | BODY MASS INDEX: 23.62 KG/M2 | DIASTOLIC BLOOD PRESSURE: 68 MMHG | SYSTOLIC BLOOD PRESSURE: 122 MMHG | HEIGHT: 70 IN | TEMPERATURE: 97.6 F

## 2018-07-19 PROCEDURE — 93922 UPR/L XTREMITY ART 2 LEVELS: CPT

## 2018-07-19 PROCEDURE — 99024 POSTOP FOLLOW-UP VISIT: CPT | Performed by: SURGERY

## 2018-07-19 PROCEDURE — 6370000000 HC RX 637 (ALT 250 FOR IP): Performed by: SURGERY

## 2018-07-19 RX ORDER — HYDROCODONE BITARTRATE AND ACETAMINOPHEN 10; 325 MG/1; MG/1
1 TABLET ORAL EVERY 8 HOURS PRN
Qty: 30 TABLET | Refills: 0 | Status: SHIPPED | OUTPATIENT
Start: 2018-07-19 | End: 2018-07-29

## 2018-07-19 RX ADMIN — LISINOPRIL 20 MG: 20 TABLET ORAL at 08:21

## 2018-07-19 RX ADMIN — POLYETHYLENE GLYCOL 3350 17 G: 17 POWDER, FOR SOLUTION ORAL at 08:20

## 2018-07-19 RX ADMIN — CLOPIDOGREL BISULFATE 75 MG: 75 TABLET ORAL at 08:21

## 2018-07-19 RX ADMIN — HYDROCODONE BITARTRATE AND ACETAMINOPHEN 2 TABLET: 5; 325 TABLET ORAL at 14:25

## 2018-07-19 RX ADMIN — CITALOPRAM HYDROBROMIDE 20 MG: 20 TABLET ORAL at 08:20

## 2018-07-19 RX ADMIN — HYDROCODONE BITARTRATE AND ACETAMINOPHEN 2 TABLET: 5; 325 TABLET ORAL at 10:50

## 2018-07-19 RX ADMIN — TAMSULOSIN HYDROCHLORIDE 0.4 MG: 0.4 CAPSULE ORAL at 08:20

## 2018-07-19 RX ADMIN — BUSPIRONE HYDROCHLORIDE 15 MG: 15 TABLET ORAL at 08:21

## 2018-07-19 RX ADMIN — PANTOPRAZOLE SODIUM 40 MG: 40 TABLET, DELAYED RELEASE ORAL at 06:08

## 2018-07-19 RX ADMIN — ASPIRIN 81 MG: 81 TABLET, COATED ORAL at 08:21

## 2018-07-19 ASSESSMENT — PAIN SCALES - GENERAL
PAINLEVEL_OUTOF10: 0
PAINLEVEL_OUTOF10: 0
PAINLEVEL_OUTOF10: 3
PAINLEVEL_OUTOF10: 7

## 2018-07-19 ASSESSMENT — PAIN DESCRIPTION - PAIN TYPE
TYPE: SURGICAL PAIN
TYPE: SURGICAL PAIN

## 2018-07-19 ASSESSMENT — PAIN DESCRIPTION - LOCATION
LOCATION: LEG
LOCATION: LEG

## 2018-07-19 NOTE — OP NOTE
superficial femoral artery because I do not have enough  length on the vein. An endarterectomy was performed at the distal common  femoral, proximal profunda femoris and proximal superficial femoral  arteries. The distal portion of superficial femoral artery was ligated and  transected. The greater saphenous vein was then marked for orientation purposes and a  bulldog clamp placed distally. The proximal saphenofemoral junction was  clamped with right angle and then transected. The first valve was lysed  under direct visualization with tenotomy scissors. The saphenofemoral  junction was ligated with 4-0 Prolene running suture. An end vein to end  superficial femoral artery anastomosis was created with 6-0 Prolene running  suture. Prior to completing this anastomosis, standard flushing techniques  were used to remove any air and debris from the native vessels and flow was  restored. The distal greater saphenous vein in the calf was then ligated with 3-0  Vicryl suture. The vein was transected and then valvulotome passed to lyse  valves. Two passes were performed via pulsatile flow. Next, an angled  Glide catheter was placed up to the saphenofemoral junction and angiograms  were performed and branches visualized. There was really only one large  branch and two very small branches distally. An incision is made over the  large branch with knife and dissection was carried through subcutaneous  tissue with Bovie electrocautery and then the branch was ligated with two  medium-sized clips. Esmarch and tourniquet were then placed and a longitudinal arteriotomy is  made in the below-knee popliteal artery with 11 blade and Mares scissors. The greater saphenous vein is cut to length and spatulated. I was able to  pass a 3 and a 3.5 mm dilator through the greater saphenous vein. Again,  papaverine heparinized saline was passed through the greater saphenous  vein.   An end greater saphenous vein to side

## 2018-07-19 NOTE — PROGRESS NOTES
.  Vascular Surgery  Dr.Scott Ochoa Jean   Daily Progress Note    Pt Name: Paige Benjamin  Medical Record Number: [id number]  Date of Birth 1950   Today's Date: 7/19/2018        SUBJECTIVE:     Patient was seen and LLE/incision lines examined. Pain is  controlled  Stating LLE very sore, groin most tender    OBJECTIVE:     Patient Vitals for the past 24 hrs:   BP Temp Temp src Pulse Resp SpO2   07/19/18 0941 - - - 80 - -   07/19/18 0726 122/68 97.6 °F (36.4 °C) - 83 18 96 %   07/19/18 0429 126/67 99.6 °F (37.6 °C) Temporal 78 16 94 %   07/19/18 0030 128/71 99.8 °F (37.7 °C) Temporal 76 18 95 %   07/18/18 1929 (!) 146/69 100.4 °F (38 °C) Temporal 80 16 95 %       Intake/Output Summary (Last 24 hours) at 07/19/18 1119  Last data filed at 07/19/18 1105   Gross per 24 hour   Intake             1680 ml   Output             3575 ml   Net            -1895 ml     In: 840 [P.O.:840]  Out: 1839 [Urine:2875]    I/O last 3 completed shifts: In: 1680 [P.O.:1680]  Out: 7282 [Urine:3175]       Date 07/19/18 0000 - 07/19/18 2359   Shift 2374-2915 9121-6327 8503-2505 24 Hour Total   I  N  T  A  K  E   P.O.  (mL/kg/hr)  500  500    Shift Total  (mL/kg)  500  (6.7)  500  (6.7)   O  U  T  P  U  T   Urine  (mL/kg/hr) 1425  (2.4) 500  1925    Shift Total  (mL/kg) 1425  (19) 500  (6.7)  1925  (25.7)   Weight (kg) 74.8 74.8 74.8 74.8       Wt Readings from Last 3 Encounters:   07/17/18 165 lb (74.8 kg)   07/05/18 161 lb (73 kg)   05/16/18 163 lb (73.9 kg)      Body mass index is 23.68 kg/m².      Diet: DIET GENERAL;    MEDS:     Scheduled Meds:   aspirin  81 mg Oral Daily    atorvastatin  20 mg Oral Nightly    busPIRone  15 mg Oral BID    citalopram  20 mg Oral Daily    gabapentin  300 mg Oral Nightly    lisinopril  20 mg Oral Daily    nicotine  1 patch Transdermal Daily    pantoprazole  40 mg Oral QAM AC    polyethylene glycol  17 g Oral Daily    tamsulosin  0.4 mg Oral Daily    sodium chloride flush  10 mL Intravenous 2 times per file.    PLAN:     1. Voiding without difficulty  2. Temp 100.4 last pm, hasn't been using incentive spirometer, patient instructed on frequency of use, he voices understanding  3. OOB with assist, asking for walker for home- will order  4. To Vascular Lab today for post op studies  5. Agree with above. I examined the patient and plan discharge today.

## 2018-07-30 ENCOUNTER — OFFICE VISIT (OUTPATIENT)
Dept: VASCULAR SURGERY | Age: 68
End: 2018-07-30

## 2018-07-30 VITALS — TEMPERATURE: 98.1 F | SYSTOLIC BLOOD PRESSURE: 143 MMHG | HEART RATE: 85 BPM | DIASTOLIC BLOOD PRESSURE: 83 MMHG

## 2018-07-30 DIAGNOSIS — I70.213 ATHEROSCLEROSIS OF NATIVE ARTERIES OF EXTREMITIES WITH INTERMITTENT CLAUDICATION, BILATERAL LEGS (HCC): Primary | ICD-10-CM

## 2018-07-30 PROCEDURE — 99024 POSTOP FOLLOW-UP VISIT: CPT | Performed by: NURSE PRACTITIONER

## 2018-08-01 NOTE — DISCHARGE SUMMARY
There is mild-to-moderate disease in  the tibial arteries.     PROCEDURE IN DETAIL:  After the patient was consented and given intravenous  antibiotics, he was brought to the operating room and placed on the  operating room table in the supine position. General endotracheal  anesthesia was achieved and the patient's left groin, leg and foot were  prepped and draped in the usual sterile procedure.     An incision is made in the left calf medially with knife. This is carried  down to expose the greater saphenous vein. It is fairly small caliber, but  I do think it is suitable for a bypass graft. Prior to going into some  spasm, I think it is around 3 mm in diameter. The greater saphenous vein  was dissected the entire length of the incision and branches were  transected between ligatures of 4-0 Vicryl suture and/or clips. Papaverine  gauze was placed around the vein. The below-knee popliteal artery was then  dissected and fairly soft.     Next, a longitudinal incision was made in the left groin with knife. This  was carried down through subcutaneous tissue with Bovie electrocautery. Any lymphatics that were encountered were ligated with 3-0 Vicryl suture  ligatures. The femoral sheath was opened longitudinally and then the  common femoral, profunda femoris and superficial femoral arteries were  exposed and encircled with loops.     The greater saphenous vein in the saphenofemoral junction was then  dissected for several centimeters beyond the saphenofemoral junction. Again, any branches that were encountered were transected between 3-0  Vicryl ligatures and/or clips. The patient was given intravenous heparin.     After adequate heparinization time and therapeutic ACT level, the vessel  loops were tightened on the arteries and longitudinal arteriotomies made in  the proximal superficial femoral artery extending into the distal common  femoral artery with Mares scissors.   I had to perform the anastomosis to  the very proximal superficial femoral artery because I do not have enough  length on the vein. An endarterectomy was performed at the distal common  femoral, proximal profunda femoris and proximal superficial femoral  arteries. The distal portion of superficial femoral artery was ligated and  transected.     The greater saphenous vein was then marked for orientation purposes and a  bulldog clamp placed distally. The proximal saphenofemoral junction was  clamped with right angle and then transected. The first valve was lysed  under direct visualization with tenotomy scissors. The saphenofemoral  junction was ligated with 4-0 Prolene running suture. An end vein to end  superficial femoral artery anastomosis was created with 6-0 Prolene running  suture. Prior to completing this anastomosis, standard flushing techniques  were used to remove any air and debris from the native vessels and flow was  restored.     The distal greater saphenous vein in the calf was then ligated with 3-0  Vicryl suture. The vein was transected and then valvulotome passed to lyse  valves. Two passes were performed via pulsatile flow. Next, an angled  Glide catheter was placed up to the saphenofemoral junction and angiograms  were performed and branches visualized. There was really only one large  branch and two very small branches distally. An incision is made over the  large branch with knife and dissection was carried through subcutaneous  tissue with Bovie electrocautery and then the branch was ligated with two  medium-sized clips.     Esmarch and tourniquet were then placed and a longitudinal arteriotomy is  made in the below-knee popliteal artery with 11 blade and Mares scissors. The greater saphenous vein is cut to length and spatulated. I was able to  pass a 3 and a 3.5 mm dilator through the greater saphenous vein. Again,  papaverine heparinized saline was passed through the greater saphenous  vein.   An end greater

## 2018-08-27 ENCOUNTER — HOSPITAL ENCOUNTER (OUTPATIENT)
Dept: VASCULAR LAB | Age: 68
Discharge: HOME | End: 2018-08-27
Payer: MEDICARE

## 2018-08-27 DIAGNOSIS — I70.213 ATHEROSCLEROSIS OF NATIVE ARTERIES OF EXTREMITIES WITH INTERMITTENT CLAUDICATION, BILATERAL LEGS (HCC): ICD-10-CM

## 2018-08-27 PROCEDURE — 93922 UPR/L XTREMITY ART 2 LEVELS: CPT

## 2018-08-27 PROCEDURE — 93925 LOWER EXTREMITY STUDY: CPT

## 2018-08-30 ENCOUNTER — TELEPHONE (OUTPATIENT)
Dept: VASCULAR SURGERY | Age: 68
End: 2018-08-30

## 2018-08-30 DIAGNOSIS — I70.213 ATHEROSCLEROSIS OF NATIVE ARTERIES OF EXTREMITIES WITH INTERMITTENT CLAUDICATION, BILATERAL LEGS (HCC): Primary | ICD-10-CM

## 2018-08-30 NOTE — TELEPHONE ENCOUNTER
----- Message from AVI López sent at 8/30/2018  8:01 AM CDT -----  Please let patient know that his study was okay.   Dr. Elmer Jo would like a 6 week bilateral ascan and ayanna

## 2018-08-30 NOTE — TELEPHONE ENCOUNTER
I called to let the pt know per Ladan King his lower arterial study looks okay. We will f/u in 6 weeks with a repeat study. Pt is aware via vm.

## 2018-10-11 ENCOUNTER — HOSPITAL ENCOUNTER (OUTPATIENT)
Dept: VASCULAR LAB | Age: 68
Discharge: HOME | End: 2018-10-11
Payer: MEDICARE

## 2018-10-11 ENCOUNTER — OFFICE VISIT (OUTPATIENT)
Dept: VASCULAR SURGERY | Age: 68
End: 2018-10-11

## 2018-10-11 VITALS
RESPIRATION RATE: 18 BRPM | HEART RATE: 75 BPM | DIASTOLIC BLOOD PRESSURE: 70 MMHG | OXYGEN SATURATION: 98 % | SYSTOLIC BLOOD PRESSURE: 140 MMHG

## 2018-10-11 DIAGNOSIS — I73.9 PVD (PERIPHERAL VASCULAR DISEASE) (HCC): Primary | ICD-10-CM

## 2018-10-11 DIAGNOSIS — I70.213 ATHEROSCLEROSIS OF NATIVE ARTERIES OF EXTREMITIES WITH INTERMITTENT CLAUDICATION, BILATERAL LEGS (HCC): ICD-10-CM

## 2018-10-11 PROCEDURE — 99024 POSTOP FOLLOW-UP VISIT: CPT | Performed by: PHYSICIAN ASSISTANT

## 2018-10-11 PROCEDURE — 93922 UPR/L XTREMITY ART 2 LEVELS: CPT

## 2018-10-11 PROCEDURE — 93925 LOWER EXTREMITY STUDY: CPT

## 2019-04-16 ENCOUNTER — HOSPITAL ENCOUNTER (OUTPATIENT)
Dept: VASCULAR LAB | Age: 69
Discharge: HOME | End: 2019-04-16
Payer: MEDICARE

## 2019-04-16 ENCOUNTER — OFFICE VISIT (OUTPATIENT)
Dept: VASCULAR SURGERY | Age: 69
End: 2019-04-16
Payer: MEDICARE

## 2019-04-16 VITALS
DIASTOLIC BLOOD PRESSURE: 82 MMHG | SYSTOLIC BLOOD PRESSURE: 141 MMHG | RESPIRATION RATE: 18 BRPM | HEART RATE: 77 BPM | OXYGEN SATURATION: 98 % | TEMPERATURE: 97.5 F

## 2019-04-16 DIAGNOSIS — I73.9 PVD (PERIPHERAL VASCULAR DISEASE) (HCC): ICD-10-CM

## 2019-04-16 PROCEDURE — 93925 LOWER EXTREMITY STUDY: CPT

## 2019-04-16 PROCEDURE — 93922 UPR/L XTREMITY ART 2 LEVELS: CPT

## 2019-04-16 PROCEDURE — 99213 OFFICE O/P EST LOW 20 MIN: CPT | Performed by: PHYSICIAN ASSISTANT

## 2019-04-16 RX ORDER — LOSARTAN POTASSIUM 100 MG/1
100 TABLET ORAL DAILY
COMMUNITY

## 2019-04-16 RX ORDER — SUCRALFATE 1 G/1
1 TABLET ORAL 4 TIMES DAILY
COMMUNITY

## 2019-04-16 NOTE — PROGRESS NOTES
Patient Care Team:  Mare Gowers as PCP - Bethel Jordan MD as Consulting Physician (Vascular Surgery)      History and Physical    Mr. Sofiya Schmid has prior history of peripheral vascular disease of the lower extremities. Current medication include statin, asa, Plavix daily. Beverley Curling has not had new wounds. He reports no claudication at this time. He reports intermittent pain in the left groin that sounds  most likely to be nerve type pain. Leesa Neely is a 79 y.o. male with the following history reviewed and recorded in [facility]:  Patient Active Problem List    Diagnosis Date Noted    PVD (peripheral vascular disease) ([facility] 04/18/2019    Atherosclerosis with claudication of extremity ([facility] 07/17/2018    Atherosclerosis of native arteries of extremities with intermittent claudication, bilateral legs (HCC) 09/23/2016    Hyperlipidemia     Hypertension      Current Outpatient Medications   Medication Sig Dispense Refill    sucralfate (CARAFATE) 1 GM tablet Take 1 g by mouth 4 times daily      losartan (COZAAR) 100 MG tablet Take 100 mg by mouth daily      diphenhydrAMINE-PE-APAP 25-5-325 MG TABS Take by mouth      gabapentin (NEURONTIN) 300 MG capsule Take 300 mg by mouth nightly. .      ondansetron (ZOFRAN-ODT) 4 MG disintegrating tablet Take 4 mg by mouth every 8 hours as needed for Nausea or Vomiting      tamsulosin (FLOMAX) 0.4 MG capsule Take 0.4 mg by mouth daily      pantoprazole sodium (PROTONIX) 40 MG PACK packet Take 40 mg by mouth every morning (before breakfast)      acetaminophen (TYLENOL) 325 MG tablet Take 650 mg by mouth every 6 hours as needed for Pain      clopidogrel (PLAVIX) 75 MG tablet TAKE 1 TABLET BY MOUTH DAILY 90 tablet 0    omeprazole (PRILOSEC) 20 MG delayed release capsule Take 20 mg by mouth daily      atorvastatin (LIPITOR) 20 MG tablet TK 1 T PO at night  11    Cholecalciferol (VITAMIN D3) 2000 UNITS TABS TK 1 T PO QD  3    citalopram (CELEXA) 20 MG tablet TAKE 1 TABLET ONCE A DAY  0    ASPIRIN LOW DOSE 81 MG EC tablet TK 1 T PO QD  11    HYDROcodone-acetaminophen (NORCO) 5-325 MG per tablet Take 1 tablet by mouth every 6 hours as needed for Pain. Zohreh Byrd nicotine (NICODERM CQ) 21 MG/24HR Place 1 patch onto the skin every 24 hours      lisinopril (PRINIVIL;ZESTRIL) 20 MG tablet Take 20 mg by mouth daily      Wheat Dextrin (EQ FIBER POWDER PO) Take by mouth daily      Naproxen Sodium (ALEVE) 220 MG CAPS Take by mouth every 4 hours as needed       Potassium 99 MG TABS Take by mouth daily       polyethylene glycol (MIRALAX) powder Take 17 g by mouth daily      busPIRone (BUSPAR) 15 MG tablet TK 1 T PO BID  3     No current facility-administered medications for this visit. Allergies: Patient has no known allergies.   Past Medical History:   Diagnosis Date    Blood circulation, collateral     CAD (coronary artery disease)     Depression     GERD with esophagitis     Hyperlipidemia     Hypertension     Neuropathy     Vascular disease, peripheral (HCC)      Past Surgical History:   Procedure Laterality Date    COLONOSCOPY      ENDOSCOPY, COLON, DIAGNOSTIC      FEMORAL BYPASS Left 11/16/2016    LEFT LOWER EXTREMITY ANGIOGRAM WITH ATHERECTOMY, BALLOON ANGIPLASTY, AND STENTING OF LEFT FEMORAL AND POPLITEAL ARTERIES performed by Maty Nice MD at [address] [facility] Right 1/11/2017    INTRAOPERATIVE ANGIOGRAM RIGHT LOWER EXTREMITY WITH PERCUTANEOUS TRANSLUMINAL BALLOON ANGIOPLASTY AND STENTING OF RIGHT SUPERFICIAL FEMORAL/ POPLITEAL ARTERIES performed by Maty Nice MD at [address] Left 7/17/2018    LEFT COMMON FEMORAL ARTERTY TO LEFT BELOW KNEE POPLITEAL ARTERY BYPASS WITH INSITU GREATER SAPHENOUS VEIN GRAFT WITH INTRAOPERATIVE AND COMPLETION ANGIOGRAM performed by Maty Nice MD at [address] [facility] VASCULAR SURGERY  10/11/2016    Right CFA 5f sheath, Aortogram with

## 2019-04-18 PROBLEM — I73.9 PVD (PERIPHERAL VASCULAR DISEASE) (HCC): Status: ACTIVE | Noted: 2019-04-18

## 2019-04-23 ENCOUNTER — TELEPHONE (OUTPATIENT)
Dept: VASCULAR SURGERY | Age: 69
End: 2019-04-23

## 2019-04-23 DIAGNOSIS — I73.9 PVD (PERIPHERAL VASCULAR DISEASE) (HCC): Primary | ICD-10-CM

## 2019-04-23 NOTE — TELEPHONE ENCOUNTER
Called and spoke with Mr Daniel Gilmore and provided him with the following information Dr. Sol Short did review the study and wants to repeat in 12 months unless he develops any new onset of leg pain. If so he is to call immediately to be seen in the office.  Daniel Gilmore voiced understanding.

## 2019-04-23 NOTE — TELEPHONE ENCOUNTER
----- Message from John Woods PA-C sent at 4/23/2019 12:42 PM CDT -----  Please call Mr. Jas Nevarez and let him know Dr. Dana Arriaza did review the study and wants to repeat in 12 months unless he develops any new onset of leg pain. If so he is to call immediately to be seen in the office. Order placed.

## 2020-04-28 ENCOUNTER — VIRTUAL VISIT (OUTPATIENT)
Dept: VASCULAR SURGERY | Age: 70
End: 2020-04-28
Payer: MEDICARE

## 2020-04-28 PROCEDURE — 99441 PR PHYS/QHP TELEPHONE EVALUATION 5-10 MIN: CPT | Performed by: PHYSICIAN ASSISTANT

## 2020-04-28 NOTE — PROGRESS NOTES
Zeeshan Aguilar is a 81 y.o. male evaluated via telephone on 4/28/2020. Consent:  He and/or health care decision maker is aware that that he may receive a bill for this telephone service, depending on his insurance coverage, and has provided verbal consent to proceed: Yes      Documentation:  I communicated with the patient and/or health care decision maker that due to the covid-19 outbreak, we are trying to limit exposures to our patients whom have elective follow ups. We are calling each individual patient to make sure they are doing okay. Details of this discussion including any medical advice provided: Mr. Crescencio Goodman is a 82 yo male who has a history of PVD. He is currently on ASA, Plavix and a statin daily. He reports bilateral intermittent leg pain that has worsened since his last test. He repots that the  Left is > than the right. He reports that this can be with or without walking. He also reports leg cramps and that at times his legs \"jump\". He denies any new or  Non healing wounds. He is still smoking. We will schedule the patient for a bilateral A'scan with VADIM's. We will call the patient with the results and discuss further options/plan/follow up. I affirm this is a Patient Initiated Episode with an Established Patient who has not had a related appointment within my department in the past 7 days or scheduled within the next 24 hours.     Patient identification was verified at the start of the visit: Yes    Total Time: minutes: 5-10 minutes    Note: not billable if this call serves to triage the patient into an appointment for the relevant concern      Joseph Shell

## 2020-05-15 ENCOUNTER — HOSPITAL ENCOUNTER (OUTPATIENT)
Dept: VASCULAR LAB | Age: 70
Discharge: HOME | End: 2020-05-15
Payer: MEDICARE

## 2020-05-15 ENCOUNTER — OFFICE VISIT (OUTPATIENT)
Dept: VASCULAR SURGERY | Age: 70
End: 2020-05-15
Payer: MEDICARE

## 2020-05-15 VITALS
DIASTOLIC BLOOD PRESSURE: 81 MMHG | HEIGHT: 67 IN | OXYGEN SATURATION: 96 % | SYSTOLIC BLOOD PRESSURE: 170 MMHG | WEIGHT: 162 LBS | HEART RATE: 62 BPM | TEMPERATURE: 97.3 F | RESPIRATION RATE: 18 BRPM | BODY MASS INDEX: 25.43 KG/M2

## 2020-05-15 DIAGNOSIS — Z01.818 PRE-OP TESTING: ICD-10-CM

## 2020-05-15 PROBLEM — I70.422: Status: ACTIVE | Noted: 2020-05-15

## 2020-05-15 PROBLEM — T82.398A: Status: ACTIVE | Noted: 2020-05-15

## 2020-05-15 LAB
ANION GAP SERPL CALCULATED.3IONS-SCNC: 13 MMOL/L (ref 7–19)
BUN BLDV-MCNC: 10 MG/DL (ref 8–23)
CALCIUM SERPL-MCNC: 9.8 MG/DL (ref 8.8–10.2)
CHLORIDE BLD-SCNC: 102 MMOL/L (ref 98–111)
CO2: 27 MMOL/L (ref 22–29)
CREAT SERPL-MCNC: 1.3 MG/DL (ref 0.5–1.2)
GFR NON-AFRICAN AMERICAN: 55
GLUCOSE BLD-MCNC: 96 MG/DL (ref 74–109)
HCT VFR BLD CALC: 44.2 % (ref 42–52)
HEMOGLOBIN: 14.5 G/DL (ref 14–18)
MCH RBC QN AUTO: 30.5 PG (ref 27–31)
MCHC RBC AUTO-ENTMCNC: 32.8 G/DL (ref 33–37)
MCV RBC AUTO: 93.1 FL (ref 80–94)
PDW BLD-RTO: 13.1 % (ref 11.5–14.5)
PLATELET # BLD: 285 K/UL (ref 130–400)
PMV BLD AUTO: 10.5 FL (ref 9.4–12.4)
POTASSIUM SERPL-SCNC: 3.6 MMOL/L (ref 3.5–5)
RBC # BLD: 4.75 M/UL (ref 4.7–6.1)
SODIUM BLD-SCNC: 142 MMOL/L (ref 136–145)
WBC # BLD: 11.2 K/UL (ref 4.8–10.8)

## 2020-05-15 PROCEDURE — 93925 LOWER EXTREMITY STUDY: CPT

## 2020-05-15 PROCEDURE — 99214 OFFICE O/P EST MOD 30 MIN: CPT | Performed by: PHYSICIAN ASSISTANT

## 2020-05-15 PROCEDURE — 93922 UPR/L XTREMITY ART 2 LEVELS: CPT

## 2020-05-15 RX ORDER — CLOPIDOGREL BISULFATE 75 MG/1
75 TABLET ORAL DAILY
Qty: 90 TABLET | Refills: 0 | Status: SHIPPED | OUTPATIENT
Start: 2020-05-15 | End: 2020-05-21

## 2020-05-15 NOTE — PROGRESS NOTES
ARTERIES performed by Kylee Bahena MD at [facility] Left 7/17/2018    LEFT COMMON FEMORAL ARTERTY TO LEFT BELOW KNEE POPLITEAL ARTERY BYPASS WITH INSITU GREATER SAPHENOUS VEIN GRAFT WITH INTRAOPERATIVE AND COMPLETION ANGIOGRAM performed by Kylee Bahena MD at [address] [facility] VASCULAR SURGERY  10/11/2016    Right CFA 5f sheath, Aortogram with bilateral lower arteriogram, mynx right CFA. [facility]    VASCULAR SURGERY  11/16/2016    SJS-Ultrasound guided left PTA 4f sheath, right CFA 5f-7f sheath, left lower arteriograms, placement of left popliteal artery emboshield filter, atherectomy left SFA with 2.1/3.0 jetstream, left popliteal/sfa balloon angioplasty 5x200 desiree, 6x150 lutonix (3), left popliteal/sfa stent 6x200 innova, left SFA stents (supera 5.5x150, 5.5x60), left lower extremity arteriograms, mynx right CFA    VASCULAR SURGERY  01/11/2017    SJS-Ultrasound left CFA 5f-7f 70cm sheath, right lower arteriograms, right ROSEMARIE u/s guided 4f sheath, balloon angioplasty right SFA/Pop occulsion with two 5x100 desiree,Right SFA/pop balloon angioplasty 6x200 desiree, right SFA/pop stents (supera 5.5x150, 5.5x60),Right SFA/pop stents balloon angioplasty 6x150 lutonix, right lower arteriograms, right SFA stent (epic 7x40), right lower arteriograms    VASCULAR SURGERY  01/11/2017    SJS-CONT-Left CFA proglide closure    VASCULAR SURGERY  05/16/2018    SJS- Right CFA 5f sheath, aortogram with bilateral lower arteriogram, Mynx right CFA    VASCULAR SURGERY Right 05/17/18 [facility]    1. Percutaneous cannulation right common femoral artery with 5 Montserratian glide sheath. 2. Suprarenal abdominal aortogram with bilateral iliofemoralarteriogram. 3. Bilateral lower extremity arteriogram. 4. MYNX closure right common femoral artery puncturesite.       Family History   Problem Relation Age of Onset    Diabetes Mother     Heart Disease Mother     Stroke Father Social History     Tobacco Use    Smoking status: Current Every Day Smoker     Packs/day: 1.00     Years: 20.00     Pack years: 20.00     Types: Cigarettes    Smokeless tobacco: Never Used    Tobacco comment: DOWN TO 1 PPD   Substance Use Topics    Alcohol use: Yes     Comment: occ       Review of Systems    Constitutional - no significant activity change, appetite change, or unexpected weight change. No fever or chills. No diaphoresis or significant fatigue. HENT - no significant rhinorrhea or epistaxis. No tinnitus or significant hearing loss. Eyes - no sudden vision change or amaurosis. Respiratory - no significant shortness of breath, wheezing, or stridor. No apnea, cough, or chest tightness associated with shortness of breath. Cardiovascular - no chest pain, syncope, or significant dizziness. No palpitations or significant leg swelling. (see HPI)  Gastrointestinal - no abdominal swelling or pain. No blood in stool. No severe constipation, diarrhea, nausea, or vomiting. Genitourinary - No difficulty urinating, dysuria, frequency, or urgency. No flank pain or hematuria. Musculoskeletal - no back pain, gait disturbance, or myalgia. Skin - no color change, rash, pallor, or new wound. Neurologic - no dizziness, facial asymmetry, or light headedness. No seizures. No speech difficulty or lateralizing weakness. Hematologic - no easy bruising or excessive bleeding. Psychiatric - no severe anxiety or nervousness. No confusion. All other review of systems are negative. Physical Exam    BP (!) 170/81 (Site: Left Upper Arm, Position: Sitting, Cuff Size: Medium Adult)   Pulse 62   Temp 97.3 °F (36.3 °C) (Temporal)   Resp 18   Ht 5' 7\" (1.702 m)   Wt 162 lb (73.5 kg)   SpO2 96%   BMI 25.37 kg/m²     Constitutional - well developed, well nourished. No diaphoresis or acute distress. HENT - head normocephalic.   Right external ear canal appears normal. Left external ear canal appears normal.  Septum appears midline. Eyes - conjunctiva normal.  EOMS normal.  No exudate. No icterus. Neck- ROM appears normal, no tracheal deviation. Cardiovascular - Regular rate and rhythm. Heart sounds are normal.  No murmur, rub, or gallop. Carotid pulses are 2+ to palpation bilaterally without bruit. Extremities - Radial and ulnar pulses are 2+ to palpation bilaterally. Femoral pulses are palpable. Right DP and PT pulses are palpable. Left DP and PT pulses are NP. No cyanosis, clubbing, or significant edema. No signs atheroembolic event. Pulmonary - effort appears normal.  No respiratory distress. Lungs - Breath sounds normal. No wheezes or rales. GI - Abdomen - soft, non tender, bowel sounds X 4 quadrants. No guarding or rebound tenderness. No distension or palpable mass. Genitourinary - deferred. Musculoskeletal - ROM appears normal.  No significant edema. Neurologic - alert and oriented X 3. Physiologic. Skin - warm, dry, and intact. No rash, erythema, or pallor. Psychiatric - mood, affect, and behavior appear normal.  Judgment and thought processes appear normal.    Risk factors for atherosclerosis of all vascular beds have been reviewed with the patient including:  Family history, tobacco abuse in all forms, elevated cholesterol, hyperlipidemia, and diabetes.     BLE arterial scan:   Impression        There appears to be a patent left femoral-popliteal artery bypass graft.   Katherine Expose is a severe stenosis of the proximal graft, left femoral to below    knee popliteal bypass bypass graft.   Katherine Expose is moderate to severe stenosis in the right superficial femoral    artery stent.        Signature        ----------------------------------------------------------------    Electronically signed by Hortensia Howard MD(Interpreting    physician) on 05/16/2020 11:12 AM    ----------------------------------------------------------------       Grafts     - The graft originated from the Left Dist Common Femoral to the Left Dist       Popliteal.       +--------------------------------------+----+---+-----+--------------------+   ! Location                              !PSV ! EDV! Ratio!% Stenosis          !   +--------------------------------------+----+---+-----+--------------------+   ! Prox Anastomosis                      !94  !08 !     !                    !   +--------------------------------------+----+---+-----+--------------------+   ! Prox Graft                            !606 ! 674!35.74!                    !   +--------------------------------------+----+---+-----+--------------------+   ! Mid Graft                             !50  !26 !0.06 !                    !   +--------------------------------------+----+---+-----+--------------------+   ! Dist Graft                            !41  !20 !0.85 !                    !   +--------------------------------------+----+---+-----+--------------------+   Stents     - The stent originated from the Right Mid SFA to the Right Prox Popliteal.       Stent velocities are as follow:Prox Stent: 510 cm/s. Mid Stent: 58 cm/s. Dist   Stent: 69 cm/s.       +-----------------------------+---+---+-----+------------------------------+   ! Location                     !PSV! EDV! Ratio!% Stenosis                    !   +-----------------------------+---+---+-----+------------------------------+   ! Prox Stent                   !510!   !     !                              !   +-----------------------------+---+---+-----+------------------------------+   ! Mid Stent                    !58 !   !0.11 !                              !   +-----------------------------+---+---+-----+------------------------------+   ! Dist Stent                   !71 !   !1.19 !                              !   +-----------------------------+---+---+-----+------------------------------+   Velocities are measured in cm/s ; Diameters are measured in mm       LE Duplex Measurements     +---------------++-----+-----+----+-----------++----+-----+----+-----------+   !               ! ! Right!     !Left!           !!    !     !    !           !   +---------------++-----+-----+----+-----------++----+-----+----+-----------+   ! Location       !!PSV  !Ratio! EDV ! Wave Desc. !!PSV ! Ratio! EDV ! Wave Desc. !   +---------------++-----+-----+----+-----------++----+-----+----+-----------+   ! Common Femoral !!93.2 !     !    !           !!106 !     !18. 2!           !   +---------------++-----+-----+----+-----------++----+-----+----+-----------+   ! Prox PFA       !!38.5 !     !    !           !!261 !     !    !           !   +---------------++-----+-----+----+-----------++----+-----+----+-----------+   ! Prox SFA       !!99.1 !     !    !           !!    !     !    !           !   +---------------++-----+-----+----+-----------++----+-----+----+-----------+   ! Mid SFA        !!63.9 !0.64 !    !           !!    !     !    !           !   +---------------++-----+-----+----+-----------++----+-----+----+-----------+   ! Dist Popliteal !!62.1 !0.97 !    !           !!    !     !    !           !   +---------------++-----+-----+----+-----------++----+-----+----+-----------+   ! Mid PTA        !!34.6 !0.56 !    !           !!13.6!     !    !           !   +---------------++-----+-----+----+-----------++----+-----+----+-----------+   ! Mid ROSEMARIE        !!39.7 !0.64 !    !           !!10  !     !    !           !   +---------------++-----+-----+----+-----------++----+-----+----+-----------+   ! Mid Peroneal   !!35.7 !0.57 !    !           !!10.8!     !5.5 !           !   +---------------++-----+-----+----+-----------++----+-----+----+-----------+         Impression        Based on ankle brachial indices and doppler waveforms, the patient has    mildly diminished flow to the right lower extremity arterial system at   [facility]    The patient has severely diminished ankle-brachial indices left lower    extremity(ies) which would be consistent with rest pain symptoms.        Signature        ----------------------------------------------------------------    Electronically signed by Cristy Gibson    physician) on 05/16/2020 11:10 AM    ----------------------------------------------------------------       Velocities are measured in cm/s ; Diameters are measured in mm       Pressures   +--------------------------------------++--------+-----+----+--------+-----+   !                                      ! ! Right   !     !Left!        !     !   +--------------------------------------++--------+-----+----+--------+-----+   ! Location                              ! ! Pressure! Ratio!    !Pressure! Ratio! +--------------------------------------++--------+-----+----+--------+-----+   ! Ankle PT                              !!156     !0.89 !    !70      !0.4  !   +--------------------------------------++--------+-----+----+--------+-----+   ! DP                                    !!158     !0.9  !    !66      !0.38 !   +--------------------------------------++--------+-----+----+--------+-----+   ! Great Toe                             !!143     !0.81 !    !42      !0.24 !   +--------------------------------------++--------+-----+----+--------+-----+         - Brachial Pressure:Right: 176. Left:175.         - VADIM:Right: 0. 9. Left: 0.4.       Plethysmographic Digit Evaluation   +---------++--------+-----+---------------++--------+-----+----------------+   !         ! ! Right   !     ! Left           !!        !     !                !   +---------++--------+-----+---------------++--------+-----+----------------+   ! Location ! !Pressure! Ratio! PPG Wave Form  !!Pressure! Ratio! PPG Wave Form   !   +---------++--------+-----+---------------++--------+-----+----------------+   ! Great EEW!!111     !0.81 !               !!43      !0.24 !                !   +---------++--------+-----+---------------++--------+-----+----------------+     Individual films

## 2020-05-16 ENCOUNTER — OFFICE VISIT (OUTPATIENT)
Age: 70
End: 2020-05-16

## 2020-05-16 NOTE — PATIENT INSTRUCTIONS
petting, snuggling, being kissed or licked, and sharing food. If you must care for your pet or be around animals while you are sick, wash your hands before and after you interact with pets and wear a facemask. Call ahead before visiting your doctor  If you have a medical appointment, call the healthcare provider and tell them that you have or may have COVID-19. This will help the healthcare providers office take steps to keep other people from getting infected or exposed. Wear a facemask  You should wear a facemask when you are around other people (e.g., sharing a room or vehicle) or pets and before you enter a healthcare providers office. If you are not able to wear a facemask (for example, because it causes trouble breathing), then people who live with you should not stay in the same room with you, or they should wear a facemask if they enter your room. Cover your coughs and sneezes  Cover your mouth and nose with a tissue when you cough or sneeze. Throw used tissues in a lined trash can. Immediately wash your hands with soap and water for at least 20 seconds or, if soap and water are not available, clean your hands with an alcohol-based hand  that contains at least 60% alcohol. Clean your hands often  Wash your hands often with soap and water for at least 20 seconds, especially after blowing your nose, coughing, or sneezing; going to the bathroom; and before eating or preparing food. If soap and water are not readily available, use an alcohol-based hand  with at least 60% alcohol, covering all surfaces of your hands and rubbing them together until they feel dry. Soap and water are the best option if hands are visibly dirty. Avoid touching your eyes, nose, and mouth with unwashed hands. Avoid sharing personal household items  You should not share dishes, drinking glasses, cups, eating utensils, towels, or bedding with other people or pets in your home.  After using these items, they should be washed thoroughly with soap and water. Clean all high-touch surfaces everyday  High touch surfaces include counters, tabletops, doorknobs, bathroom fixtures, toilets, phones, keyboards, tablets, and bedside tables. Also, clean any surfaces that may have blood, stool, or body fluids on them. Use a household cleaning spray or wipe, according to the label instructions. Labels contain instructions for safe and effective use of the cleaning product including precautions you should take when applying the product, such as wearing gloves and making sure you have good ventilation during use of the product. Monitor your symptoms  Seek prompt medical attention if your illness is worsening (e.g., difficulty breathing). Before seeking care, call your healthcare provider and tell them that you have, or are being evaluated for, COVID-19. Put on a facemask before you enter the facility. These steps will help the healthcare providers office to keep other people in the office or waiting room from getting infected or exposed. Ask your healthcare provider to call the local or [facility] health department. Persons who are placed under active monitoring or facilitated self-monitoring should follow instructions provided by their local health department or occupational health professionals, as appropriate. When working with your local health department check their available hours. If you have a medical emergency and need to call 911, notify the dispatch personnel that you have, or are being evaluated for COVID-19. If possible, put on a facemask before emergency medical services arrive. Discontinuing home isolation  Patients with confirmed COVID-19 should remain under home isolation precautions until the risk of secondary transmission to others is thought to be low.  The decision to discontinue home isolation precautions should be made on a case-by-case basis, in consultation with healthcare providers and state and [facility] health departments. [employer] allows you to send messages to your doctor, view your test results, renew your prescriptions, schedule appointments, view visit notes, and more. How Do I Sign Up? 1. In your Internet browser, go to [email or web address]  2. Click on the Sign Up Now link in the Sign In box. You will see the New Member Sign Up page. 3. Enter your [employer] Access Code exactly as it appears below. You will not need to use this code after youve completed the sign-up process. If you do not sign up before the expiration date, you must request a new code. [employer] Access Code: [email or web address]  Expires: 6/12/2020  2:57 PM    4. Enter your Social Security Number ([id number]) and Date of Birth (mm/dd/yyyy) as indicated and click Submit. You will be taken to the next sign-up page. 5. Create a [employer] ID. This will be your [employer] login ID and cannot be changed, so think of one that is secure and easy to remember. 6. Create a [employer] password. You can change your password at any time. 7. Enter your Password Reset Question and Answer. This can be used at a later time if you forget your password. 8. Enter your e-mail address. You will receive e-mail notification when new information is available in [address]. 9. Click Sign Up. You can now view your medical record. Additional Information  If you have questions, please contact the physician practice where you receive care. Remember, [employer] is NOT to be used for urgent needs. For medical emergencies, dial 911. For questions regarding your [employer] account call [phone]. If you have a clinical question, please call your doctor's office.

## 2020-05-16 NOTE — PROGRESS NOTES
2020    Armani Mason (:  1950) is a 81 y.o. male, here requesting COVID-19 testing    History of Present Illness  pre-op screening, not seen in clinic    There were no vitals filed for this visit. ASSESSMENT  Screening for COVID-19/ Viral disease    PLAN  POCT influenza testing Not Indicated  COVID-19 sample collected and submitted  Patient given detailed CDC instructions contained within After Visit Summary       An  electronic signature was used to authenticate this note.     --AVI Ray on 2020 at 9:00 AM

## 2020-05-19 LAB
REPORT: NORMAL
SARS-COV-2: NOT DETECTED
THIS TEST SENT TO: NORMAL

## 2020-05-20 ENCOUNTER — PREP FOR PROCEDURE (OUTPATIENT)
Dept: VASCULAR SURGERY | Age: 70
End: 2020-05-20

## 2020-05-20 ENCOUNTER — TELEPHONE (OUTPATIENT)
Dept: VASCULAR SURGERY | Age: 70
End: 2020-05-20

## 2020-05-20 RX ORDER — SODIUM CHLORIDE 0.9 % (FLUSH) 0.9 %
10 SYRINGE (ML) INJECTION PRN
Status: CANCELLED | OUTPATIENT
Start: 2020-05-20

## 2020-05-20 RX ORDER — ASPIRIN 81 MG/1
81 TABLET ORAL ONCE
Status: CANCELLED | OUTPATIENT
Start: 2020-05-20 | End: 2020-05-20

## 2020-05-21 ENCOUNTER — HOSPITAL ENCOUNTER (OUTPATIENT)
Dept: INTERVENTIONAL RADIOLOGY/VASCULAR | Age: 70
Discharge: HOME | End: 2020-05-21
Payer: MEDICARE

## 2020-05-21 VITALS
HEIGHT: 67 IN | WEIGHT: 162 LBS | RESPIRATION RATE: 14 BRPM | BODY MASS INDEX: 25.43 KG/M2 | TEMPERATURE: 98.5 F | SYSTOLIC BLOOD PRESSURE: 152 MMHG | DIASTOLIC BLOOD PRESSURE: 75 MMHG | HEART RATE: 60 BPM | OXYGEN SATURATION: 97 %

## 2020-05-21 PROCEDURE — 75774 ARTERY X-RAY EACH VESSEL: CPT | Performed by: SURGERY

## 2020-05-21 PROCEDURE — 6360000002 HC RX W HCPCS: Performed by: SURGERY

## 2020-05-21 PROCEDURE — 6360000004 HC RX CONTRAST MEDICATION: Performed by: SURGERY

## 2020-05-21 PROCEDURE — 37242 VASC EMBOLIZE/OCCLUDE ARTERY: CPT | Performed by: SURGERY

## 2020-05-21 PROCEDURE — 2500000003 HC RX 250 WO HCPCS: Performed by: SURGERY

## 2020-05-21 PROCEDURE — 75625 CONTRAST EXAM ABDOMINL AORTA: CPT | Performed by: SURGERY

## 2020-05-21 PROCEDURE — 93922 UPR/L XTREMITY ART 2 LEVELS: CPT

## 2020-05-21 PROCEDURE — 37224 PR REVSC OPN/PRG FEM/POP W/ANGIOPLASTY UNI: CPT | Performed by: SURGERY

## 2020-05-21 PROCEDURE — 75716 ARTERY X-RAYS ARMS/LEGS: CPT | Performed by: SURGERY

## 2020-05-21 PROCEDURE — 6370000000 HC RX 637 (ALT 250 FOR IP): Performed by: SURGERY

## 2020-05-21 PROCEDURE — 2580000003 HC RX 258: Performed by: INTERNAL MEDICINE

## 2020-05-21 PROCEDURE — 37224 HC PLASTY UNI FEMPOP: CPT | Performed by: SURGERY

## 2020-05-21 PROCEDURE — C1769 GUIDE WIRE: HCPCS

## 2020-05-21 PROCEDURE — 2580000003 HC RX 258: Performed by: PHYSICIAN ASSISTANT

## 2020-05-21 PROCEDURE — 2500000003 HC RX 250 WO HCPCS: Performed by: PHYSICIAN ASSISTANT

## 2020-05-21 RX ORDER — HEPARIN SODIUM 5000 [USP'U]/ML
INJECTION, SOLUTION INTRAVENOUS; SUBCUTANEOUS
Status: COMPLETED | OUTPATIENT
Start: 2020-05-21 | End: 2020-05-21

## 2020-05-21 RX ORDER — SODIUM CHLORIDE 9 MG/ML
INJECTION, SOLUTION INTRAVENOUS CONTINUOUS
Status: DISCONTINUED | OUTPATIENT
Start: 2020-05-21 | End: 2020-05-23 | Stop reason: HOSPADM

## 2020-05-21 RX ORDER — HYDROCODONE BITARTRATE AND ACETAMINOPHEN 5; 325 MG/1; MG/1
1 TABLET ORAL EVERY 4 HOURS PRN
Status: DISCONTINUED | OUTPATIENT
Start: 2020-05-21 | End: 2020-05-23 | Stop reason: HOSPADM

## 2020-05-21 RX ORDER — CEFAZOLIN SODIUM 1 G/50ML
1 INJECTION, SOLUTION INTRAVENOUS
Status: COMPLETED | OUTPATIENT
Start: 2020-05-21 | End: 2020-05-21

## 2020-05-21 RX ORDER — CLOPIDOGREL BISULFATE 75 MG/1
150 TABLET ORAL ONCE
Status: COMPLETED | OUTPATIENT
Start: 2020-05-21 | End: 2020-05-21

## 2020-05-21 RX ORDER — IODIXANOL 320 MG/ML
INJECTION, SOLUTION INTRAVASCULAR
Status: COMPLETED | OUTPATIENT
Start: 2020-05-21 | End: 2020-05-21

## 2020-05-21 RX ORDER — ASPIRIN 81 MG/1
81 TABLET ORAL ONCE
Status: DISCONTINUED | OUTPATIENT
Start: 2020-05-21 | End: 2020-05-23 | Stop reason: HOSPADM

## 2020-05-21 RX ORDER — MIDAZOLAM HYDROCHLORIDE 1 MG/ML
INJECTION INTRAMUSCULAR; INTRAVENOUS
Status: COMPLETED | OUTPATIENT
Start: 2020-05-21 | End: 2020-05-21

## 2020-05-21 RX ORDER — FENTANYL CITRATE 50 UG/ML
INJECTION, SOLUTION INTRAMUSCULAR; INTRAVENOUS
Status: COMPLETED | OUTPATIENT
Start: 2020-05-21 | End: 2020-05-21

## 2020-05-21 RX ORDER — ONDANSETRON 2 MG/ML
4 INJECTION INTRAMUSCULAR; INTRAVENOUS EVERY 8 HOURS PRN
Status: DISCONTINUED | OUTPATIENT
Start: 2020-05-21 | End: 2020-05-23 | Stop reason: HOSPADM

## 2020-05-21 RX ORDER — CLOPIDOGREL BISULFATE 75 MG/1
75 TABLET ORAL DAILY
Qty: 30 TABLET | Refills: 5 | Status: SHIPPED | OUTPATIENT
Start: 2020-05-21

## 2020-05-21 RX ORDER — SODIUM CHLORIDE 0.9 % (FLUSH) 0.9 %
10 SYRINGE (ML) INJECTION PRN
Status: DISCONTINUED | OUTPATIENT
Start: 2020-05-21 | End: 2020-05-23 | Stop reason: HOSPADM

## 2020-05-21 RX ORDER — ACETAMINOPHEN 325 MG/1
650 TABLET ORAL EVERY 4 HOURS PRN
Status: DISCONTINUED | OUTPATIENT
Start: 2020-05-21 | End: 2020-05-23 | Stop reason: HOSPADM

## 2020-05-21 RX ORDER — LIDOCAINE HYDROCHLORIDE 20 MG/ML
INJECTION, SOLUTION INFILTRATION; PERINEURAL
Status: COMPLETED | OUTPATIENT
Start: 2020-05-21 | End: 2020-05-21

## 2020-05-21 RX ORDER — HYDROCODONE BITARTRATE AND ACETAMINOPHEN 5; 325 MG/1; MG/1
2 TABLET ORAL EVERY 4 HOURS PRN
Status: DISCONTINUED | OUTPATIENT
Start: 2020-05-21 | End: 2020-05-23 | Stop reason: HOSPADM

## 2020-05-21 RX ADMIN — IODIXANOL 160 ML: 320 INJECTION, SOLUTION INTRAVASCULAR at 09:15

## 2020-05-21 RX ADMIN — SODIUM CHLORIDE: 9 INJECTION, SOLUTION INTRAVENOUS at 07:30

## 2020-05-21 RX ADMIN — MIDAZOLAM 1 MG: 1 INJECTION INTRAMUSCULAR; INTRAVENOUS at 08:07

## 2020-05-21 RX ADMIN — LIDOCAINE HYDROCHLORIDE 10 ML: 20 INJECTION, SOLUTION INFILTRATION; PERINEURAL at 08:12

## 2020-05-21 RX ADMIN — FENTANYL CITRATE 25 MCG: 50 INJECTION, SOLUTION INTRAMUSCULAR; INTRAVENOUS at 08:08

## 2020-05-21 RX ADMIN — HEPARIN SODIUM 5000 UNITS: 5000 INJECTION, SOLUTION INTRAVENOUS; SUBCUTANEOUS at 08:07

## 2020-05-21 RX ADMIN — Medication: at 07:45

## 2020-05-21 RX ADMIN — FENTANYL CITRATE 25 MCG: 50 INJECTION, SOLUTION INTRAMUSCULAR; INTRAVENOUS at 08:36

## 2020-05-21 RX ADMIN — CLOPIDOGREL BISULFATE 150 MG: 75 TABLET ORAL at 11:12

## 2020-05-21 RX ADMIN — CEFAZOLIN SODIUM 1 G: 1 INJECTION, SOLUTION INTRAVENOUS at 07:56

## 2020-05-21 RX ADMIN — MIDAZOLAM 1 MG: 1 INJECTION INTRAMUSCULAR; INTRAVENOUS at 08:36

## 2020-05-21 RX ADMIN — HEPARIN SODIUM 5000 UNITS: 5000 INJECTION, SOLUTION INTRAVENOUS; SUBCUTANEOUS at 08:27

## 2020-05-21 NOTE — PROGRESS NOTES
BEDREST COMPLETED. PT OOB WITH RN @ SIDE. PT AMBULATED TO BATHROOM TO VOID, THEN AMBULATED IN CATH HOLDING HALLS WITHOUT S/S BLEEDING NOTED. PT DENIES ANY PAIN AT RT GROIN.

## 2020-05-21 NOTE — PROGRESS NOTES
Bilateral Ankle Brachial Indices performed. RT VADIM:   0.98                      LT VADIM: 1.10     DIGIT: 0.74            DIGIT: 0.89    This is a preliminary report.  Final report pending

## 2020-05-21 NOTE — OP NOTE
5 Thai sheath for a 6 Western Ester [place] destination sheath which was placed into the left common femoral artery. Selective left lower extremity arteriograms were performed with the above findings. I utilized a Navicross catheter and an 018 wire to cross the lesion. Balloon angioplasty was performed of the 2 areas of stenosis with a 6 mm x 20 mm cutting balloon. Arteriogram show improvement. Balloon angioplasty was then performed of this area with a 7 mm x 60 mm Lutonix drug-coated balloon. This balloon was left inflated for 3 minutes. Completion arteriograms here show an excellent result with no significant residual stenosis nor extravasation of dye. Finally, the branch in the bypass graft was selectively cannulated with the help of an angled Glidewire and the [facility] cross catheter. Three 5 mm x 5 cm coils were placed. Completion arteriograms here show good result. The flow was slowed in this branch. It should clot with time. The distal anastomosis is better seen with the above findings. The right common femoral artery sheath was removed and the puncture site closed with the minx device. The patient tolerated the procedure well. He is brought back to cath holding in good condition.

## 2020-05-28 ENCOUNTER — TELEPHONE (OUTPATIENT)
Dept: VASCULAR SURGERY | Age: 70
End: 2020-05-28

## 2020-05-28 NOTE — TELEPHONE ENCOUNTER
Tavo Roberts called to let us know his knee is swollen and has a cici on his knee. He had surgery 1 week ago. I spoke with Launie Duverney, PA-C and she said to make sure he is elevating his feet above heart level and Tavo Roberts said he is. I also made him aware that per Brittanie Herrera, if the cici gets bigger or he starts experiencing pain to call our office back and we will make his appointment sooner than his original post op appt 6/4/20.

## 2020-06-04 ENCOUNTER — OFFICE VISIT (OUTPATIENT)
Dept: VASCULAR SURGERY | Age: 70
End: 2020-06-04
Payer: MEDICARE

## 2020-06-04 VITALS
BODY MASS INDEX: 25.43 KG/M2 | RESPIRATION RATE: 16 BRPM | SYSTOLIC BLOOD PRESSURE: 122 MMHG | DIASTOLIC BLOOD PRESSURE: 72 MMHG | HEIGHT: 67 IN | OXYGEN SATURATION: 98 % | WEIGHT: 162 LBS | TEMPERATURE: 97.6 F | HEART RATE: 79 BPM

## 2020-06-04 PROCEDURE — 99212 OFFICE O/P EST SF 10 MIN: CPT | Performed by: PHYSICIAN ASSISTANT

## 2020-06-04 NOTE — PROGRESS NOTES
Patient Care Team:  Annamarie Whitehead as PCP - Juvenal King MD as Consulting Physician (Vascular Surgery)      Mr. Rodrigo Hatch  presents for follow up after an Arteriogram with 1.  Percutaneous cannulation right common femoral artery with 5 Western Ester and later 6 Western Ester [place] destination sheath  2. Suprarenal abdominal aortogram with bilateral iliofemoral arteriogram and bilateral lower extremity arteriogram  3. Selective left lower extremity arteriogram  4. Balloon angioplasty proximal left lower extremity bypass graft with 6 mm x 20 mm cutting balloon and 7 mm x 60 mm Lutonix drug-coated balloon  5. Coil embolization of distal bypass graft branch with three 5 mm x 5 cm coils  6. Completion left lower extremity arteriograms  7. Minx closure right common femoral artery puncture site done on 5/21/2020 by Dr. Karthik Burr. Procedure was done for peripheral vascular disease with claudication. Post op problems include none. Angiogram complications were none. Post op pain and swelling are minimal.  He is down to 6 cigarettes per day. He reports that his leg overall feels better. He had some swelling noted on the left medial knee area that has improved. No fever or  Redness.        Lorelei Mcgee is a 81 y.o. 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, left leg ([address] 05/15/2020    PVD (peripheral vascular disease) ([facility] 04/18/2019    Atherosclerosis with claudication of extremity ([address] 07/17/2018    Atherosclerosis of native arteries of extremities with intermittent claudication, bilateral legs (HCC) 09/23/2016    Hyperlipidemia     Hypertension      Current Outpatient Medications   Medication Sig Dispense Refill    clopidogrel (PLAVIX) 75 MG tablet Take 1 tablet by mouth daily 30 BALLOON ANGIPLASTY, AND STENTING OF LEFT FEMORAL AND POPLITEAL ARTERIES performed by Sandra Anne MD at [facility] Right 1/11/2017    INTRAOPERATIVE ANGIOGRAM RIGHT LOWER EXTREMITY WITH PERCUTANEOUS TRANSLUMINAL BALLOON ANGIOPLASTY AND STENTING OF RIGHT SUPERFICIAL FEMORAL/ POPLITEAL ARTERIES performed by Sandra Anne MD at [facility] Left 7/17/2018    LEFT COMMON FEMORAL ARTERTY TO LEFT BELOW KNEE POPLITEAL ARTERY BYPASS WITH INSITU GREATER SAPHENOUS VEIN GRAFT WITH INTRAOPERATIVE AND COMPLETION ANGIOGRAM performed by Sandra Anne MD at [facility] VASCULAR SURGERY  10/11/2016    Right CFA 5f sheath, Aortogram with bilateral lower arteriogram, mynx right CFA. SJS    VASCULAR SURGERY  11/16/2016    SJS-Ultrasound guided left PTA 4f sheath, right CFA 5f-7f sheath, left lower arteriograms, placement of left popliteal artery emboshield filter, atherectomy left SFA with 2.1/3.0 jetstream, left popliteal/sfa balloon angioplasty 5x200 desiree, 6x150 lutonix (3), left popliteal/sfa stent 6x200 innova, left SFA stents (supera 5.5x150, 5.5x60), left lower extremity arteriograms, mynx right CFA    VASCULAR SURGERY  01/11/2017    SJS-Ultrasound left CFA 5f-7f 70cm sheath, right lower arteriograms, right ROSEMARIE u/s guided 4f sheath, balloon angioplasty right SFA/Pop occulsion with two 5x100 desiree,Right SFA/pop balloon angioplasty 6x200 desiree, right SFA/pop stents (supera 5.5x150, 5.5x60),Right SFA/pop stents balloon angioplasty 6x150 lutonix, right lower arteriograms, right SFA stent (epic 7x40), right lower arteriograms    VASCULAR SURGERY  01/11/2017    SJS-CONT-Left CFA proglide closure    VASCULAR SURGERY  05/16/2018    SJS- Right CFA 5f sheath, aortogram with bilateral lower arteriogram, Mynx right CFA    VASCULAR SURGERY Right 05/17/18 SJS    1.  Percutaneous cannulation right common femoral artery with 5 Romanian glide (1.702 m)   Wt 162 lb (73.5 kg)   SpO2 98%   BMI 25.37 kg/m²     Constitutional - well developed, well nourished. No diaphoresis or acute distress. HENT - head normocephalic. Right external ear canal appears normal.  Left external ear canal appears normal.  Septum appears midline. Eyes - conjunctiva normal.  EOMS normal.  No exudate. No icterus. Neck- ROM appears normal, no tracheal deviation. Cardiovascular - Regular rate and rhythm. Heart sounds are normal.  No murmur, rub, or gallop. Carotid pulses are 2+ to palpation bilaterally without bruit. Extremities - Radial and brachial pulses are 2+ to palpation bilaterally. Femoral pulses are palpable. Left DP, PT and peroneal pulses with 2+ doppler signals present. No cyanosis, clubbing, or significant edema. No signs atheroembolic event. Right groin without significant ecchymosis or pulsatile mass to suggest pseudoaneurysm, AV fistula, or significant hematoma formation. Pulmonary - effort appears normal.  Breath sounds normal.  No respiratory distress. No wheezes or rales. Abdomen - soft, non tender, bowel sounds X 4 quadrants. No guarding or rebound tenderness. No distension or palpable mass. Genitourinary - deferred. Musculoskeletal - ROM appears normal.  No significant edema. Neurologic - alert and oriented X 3. Physiologic. Skin - warm, dry, and intact. No erythema, or pallor. Dishydrotic eczema noted left palm. Psychiatric - mood, affect, and behavior appear normal.  Judgment and thought processes appear normal.    Risk factors for atherosclerosis of all vascular beds have been reviewed with the patient including:  Family history, tobacco abuse in all forms, elevated cholesterol, hyperlipidemia, and diabetes. Assessment      1.  PVD       Plan      Start/Continue ASA EC 81 mg daily  Start/Continue Plavix 75 mg daily  Strongly encourage statin therapy  Good moisturization  Good skin care  Recommend no smoking   Follow up in 3 months  with a bilateral  A'scan with VADIM's or sooner if claudication worsens/develops, develops new wound or IRP        Olivia Fernando

## 2020-10-13 ENCOUNTER — TELEPHONE (OUTPATIENT)
Dept: VASCULAR SURGERY | Age: 70
End: 2020-10-13

## 2020-10-15 ENCOUNTER — HOSPITAL ENCOUNTER (OUTPATIENT)
Dept: VASCULAR LAB | Age: 70
Discharge: HOME | End: 2020-10-15
Payer: MEDICARE

## 2020-10-15 PROCEDURE — 93925 LOWER EXTREMITY STUDY: CPT

## 2020-10-15 PROCEDURE — 93922 UPR/L XTREMITY ART 2 LEVELS: CPT

## 2020-10-27 ENCOUNTER — VIRTUAL VISIT (OUTPATIENT)
Dept: VASCULAR SURGERY | Age: 70
End: 2020-10-27
Payer: MEDICARE

## 2020-10-27 PROCEDURE — 99442 PR PHYS/QHP TELEPHONE EVALUATION 11-20 MIN: CPT | Performed by: PHYSICIAN ASSISTANT

## 2020-10-27 NOTE — PROGRESS NOTES
Rachelle Mclaughlin is a 78 y.o. male evaluated via telephone on 10/27/2020. Consent:  He and/or health care decision maker is aware that that he may receive a bill for this telephone service, depending on his insurance coverage, and has provided verbal consent to proceed: Yes      Documentation:  I communicated with the patient and/or health care decision maker about due to the covid-19 outbreak, we are trying to limit exposures to our patients whom have elective follow ups. We are calling each individual patient to make sure they are doing okay. Rachelle Mclaughlin is a 78 y.o. 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, left leg ([facility] 05/15/2020    PVD (peripheral vascular disease) ([facility] 04/18/2019    [facility] with [facility] 07/17/2018    Atherosclerosis of native arteries of extremities with intermittent claudication, bilateral legs (HCC) 09/23/2016    Hyperlipidemia     Hypertension      Current Outpatient Medications   Medication Sig Dispense Refill    clopidogrel (PLAVIX) 75 MG tablet Take 1 tablet by mouth daily 30 tablet 5    sucralfate (CARAFATE) 1 GM tablet Take 1 g by mouth 4 times daily      losartan (COZAAR) 100 MG tablet Take 100 mg by mouth daily      HYDROcodone-acetaminophen (NORCO) 5-325 MG per tablet Take 1 tablet by mouth every 6 hours as needed for Pain. Gustavo Carey gabapentin (NEURONTIN) 300 MG capsule Take 300 mg by mouth nightly. .      ondansetron (ZOFRAN-ODT) 4 MG disintegrating tablet Take 4 mg by mouth every 8 hours as needed for Nausea or Vomiting      lisinopril (PRINIVIL;ZESTRIL) 20 MG tablet Take 20 mg by mouth daily      Wheat Dextrin (EQ FIBER POWDER PO) Take by mouth daily      tamsulosin (FLOMAX) 0.4 MG capsule Take 0.4 mg by mouth SURGERY  10/11/2016    Right CFA 5f sheath, Aortogram with bilateral lower arteriogram, mynx right CFA. SJS    VASCULAR SURGERY  11/16/2016    SJS-Ultrasound guided left PTA 4f sheath, right CFA 5f-7f sheath, left lower arteriograms, placement of left popliteal artery emboshield filter, atherectomy left SFA with 2.1/3.0 jetstream, left popliteal/sfa balloon angioplasty 5x200 desiree, 6x150 lutonix (3), left popliteal/sfa stent 6x200 innova, left SFA stents (supera 5.5x150, 5.5x60), left lower extremity arteriograms, mynx right CFA    VASCULAR SURGERY  01/11/2017    SJS-Ultrasound left CFA 5f-7f 70cm sheath, right lower arteriograms, right ROSEMARIE u/s guided 4f sheath, balloon angioplasty right SFA/Pop occulsion with two 5x100 desiree,Right SFA/pop balloon angioplasty 6x200 desiree, right SFA/pop stents (supera 5.5x150, 5.5x60),Right SFA/pop stents balloon angioplasty 6x150 lutonix, right lower arteriograms, right SFA stent (epic 7x40), right lower arteriograms    VASCULAR SURGERY  01/11/2017    SJS-CONT-Left CFA proglide closure    VASCULAR SURGERY  05/16/2018    SJS- Right CFA 5f sheath, aortogram with bilateral lower arteriogram, Mynx right CFA    VASCULAR SURGERY Right 05/17/18 SJS    1. Percutaneous cannulation right common femoral artery with 5 Mauritian glide sheath. 2. Suprarenal abdominal aortogram with bilateral iliofemoralarteriogram. 3. Bilateral lower extremity arteriogram. 4. MYNX closure right common femoral artery puncturesite.       Family History   Problem Relation Age of Onset    Diabetes Mother     Heart Disease Mother     Stroke Father      Social History     Tobacco Use    Smoking status: Current Every Day Smoker     Packs/day: 1.00     Years: 20.00     Pack years: 20.00     Types: Cigarettes    Smokeless tobacco: Never Used   Substance Use Topics    Alcohol use: Yes     Comment: occ       Details of this discussion including any medical advice provided: PMH, medications and allergies reviewed. Mr. Courtney Moreira is a 79 yo male who has a history of PVD. Today we are following up on his PVD. He reports pain in his legs with walking, right leg is worse than the left. At times he reports pan at rest in his right leg. No non healing wounds noted. He is currently on ASA and a statin daily. He isn't sure if he is taking Plavix. He is still smoking. BLE A'ascan with VADIM's -       Impression          Bilateral lower extremity arterial duplex exam performed.          The right lower extremity shows plaque in the CFA with no stenosis. The     profunda femoral artery shows no stenosis.     There is plaque throughout the SFA and popliteal artery. The stent in the     superficial femoral artery is patent, however, there is a severe stenosis     in the proximal portion of the stent. Distally, flow is seen without     stenosis.     The left leg shows wide patency of the CFA. There is a mild origin     stenosis of the left PFA. The femoral to PTA in situ bypass is widely     patent without stenosis. Good outflow waveforms are seen in the PTA, ROSEMARIE,     and peroneal artery.          Signature          ----------------------------------------------------------------     Electronically signed by Leanne Fabian MD(Interpreting     physician) on 10/15/2020 11:48 AM     ----------------------------------------------------------------         Grafts      - The graft originated from the Left Common Femoral to the Left Dist        Popliteal.         +--------------------------------------+----+---+-----+--------------------+    ! Location                              !PSV ! EDV! Ratio!% Stenosis          !    +--------------------------------------+----+---+-----+--------------------+    ! Prox Anastomosis                      !114 !   !     !                    !    +--------------------------------------+----+---+-----+--------------------+    ! Prox Graft                            !40  !   !0.82 !                    ! +---------------++-----+-----+----+-----------++----+-----+---+------------+    ! Common Femoral !!132  !     !8.64!           !!120 !     !   !            !    +---------------++-----+-----+----+-----------++----+-----+---+------------+    ! Prox PFA       !!111  !     !    !           !!173 !     !   !            !    +---------------++-----+-----+----+-----------++----+-----+---+------------+    ! Prox SFA       !!38.8 !     !    !           !!    !     !   !            !    +---------------++-----+-----+----+-----------++----+-----+---+------------+    ! Mid SFA        !!32.8 !0.85 !    !           !!    !     !   !            !    +---------------++-----+-----+----+-----------++----+-----+---+------------+    ! Dist Popliteal !!35.8 !1.09 !    !           !!    !     !   !            !    +---------------++-----+-----+----+-----------++----+-----+---+------------+    ! Dist PTA       !!21   !0.59 !    !           !!56.3!     !   !            !    +---------------++-----+-----+----+-----------++----+-----+---+------------+    ! Prox ROSEMARIE       !!47.1 !1.32 !    !           !!73.3!     !   !            !    +---------------++-----+-----+----+-----------++----+-----+---+------------+    ! Mid Peroneal   !!20.7 !0.58 !    !           !!52.2!     !   !            !    +---------------++-----+-----+----+-----------++----+-----+---+------------+           Impression          Based on ankle-brachial indices and doppler waveforms, the patient has     relatively normal flow to the left lower extremity arterial system at    [facility] on ankle brachial indices and doppler waveforms, the patient has     mildly diminished flow to the right lower extremity arterial system at    [facility].    Matthew Lava is a small drop in ankle-brachial indices in the right lower     extremity(ies) after exercise which would be consistent with claudication.          Signature          ----------------------------------------------------------------  Electronically signed by Cuba TALBERTInterpreting     physician) on 10/15/2020 11:42 AM     ----------------------------------------------------------------         Velocities are measured in cm/s ; Diameters are measured in mm         Pressures    +--------------------------------------++--------+-----+----+--------+-----+    !                                      ! ! Right   !     !Left!        !     !    +--------------------------------------++--------+-----+----+--------+-----+    ! Location                              ! ! Pressure! Ratio!    !Pressure! Ratio! +--------------------------------------++--------+-----+----+--------+-----+    ! Ankle PT                              !!120     !0.9  !    !160     !1.2  !    +--------------------------------------++--------+-----+----+--------+-----+    ! DP                                    !!122     !0.92 !    !156     !1.17 ! +--------------------------------------++--------+-----+----+--------+-----+    ! Great Toe                             !!95      !0.71 !    !114     !0.86 !    +--------------------------------------++--------+-----+----+--------+-----+           - Brachial Pressure:Right: 133. Left:130.           - VADIM:Right: 0.92. Left: 1.2.         Plethysmographic Digit Evaluation    +---------++--------+-----+---------------++--------+-----+----------------+    !         ! ! Right   !     ! Left           !!        !     !                !    +---------++--------+-----+---------------++--------+-----+----------------+    ! Location ! !Pressure! Ratio! PPG Wave Form  !!Pressure! Ratio! PPG Wave Form   !    +---------++--------+-----+---------------++--------+-----+----------------+    ! Great Toe!!95      !0.71 !               !!114     !0.86 !                !    +---------++--------+-----+---------------++--------+-----+----------------+         Post Exercise         +------------+----+------------+---------+--------+------------+-----------+    !

## 2020-11-02 ENCOUNTER — TELEPHONE (OUTPATIENT)
Dept: VASCULAR SURGERY | Age: 70
End: 2020-11-02

## 2020-11-02 NOTE — TELEPHONE ENCOUNTER
Unable to reach patient by phone in regards to scheduling angiogram with Dr. Damaris Javier, left message for patient to call the office. 1:34pm Spoke with patient, details/instructions and medications were addressed (see letter) for angiogram at [facility] on 11/9/20. The office will give him a call on Fri. 11/6/20 between 2:00pm and 4:00pm to inform him of the arrival time. He will need a  to take him home. To ensure the health and safety of our patients and staff, [facility] has implemented visitor restrictions. Only one person will be allowed to accompany you for your procedure. If you or your visitor are exhibiting signs & symptoms of illness such as fever, cough, sore throat or body aches, we ask that you reschedule your procedure to a later date after your symptoms have been resolved. New policy requires that anyone who comes into the hospital will be required to wear a face mask. A cloth mask is acceptable. You will need to register at the [address] [place] [address] on Thurs. 11/5/20 for lab. You will need to go to the back of the [facility] and enter through that door which will be on the 2nd floor. The lab is located on the 2nd floor. After you get the lab you will need to go for the COVID-19 test.New guidelines require a COVID 19 test to be done prior to procedure and we suggest self quarantine after the test until procedure. You will need to go to the [facility] on Thurs. 11/5/20 before 11:00am for the COVID 19 test. They open at 8:00am. You will go to the front of the building and drive under the awning and someone will come to your car. Do not get out of the car. Let them know that you are scheduled for a procedure with Dr. Damaris Javier on 11/09/2020. After your COVID 19 test we suggest that you self quarantine until your procedure. Written instructions were mailed to the patient.

## 2020-11-05 ENCOUNTER — OFFICE VISIT (OUTPATIENT)
Age: 70
End: 2020-11-05

## 2020-11-05 VITALS — HEART RATE: 76 BPM | TEMPERATURE: 98.2 F | OXYGEN SATURATION: 96 %

## 2020-11-05 DIAGNOSIS — Z01.818 PRE-OP TESTING: ICD-10-CM

## 2020-11-05 LAB
ANION GAP SERPL CALCULATED.3IONS-SCNC: 12 MMOL/L (ref 7–19)
BUN BLDV-MCNC: 10 MG/DL (ref 8–23)
CALCIUM SERPL-MCNC: 9.7 MG/DL (ref 8.8–10.2)
CHLORIDE BLD-SCNC: 102 MMOL/L (ref 98–111)
CO2: 25 MMOL/L (ref 22–29)
CREAT SERPL-MCNC: 1.4 MG/DL (ref 0.5–1.2)
GFR AFRICAN AMERICAN: >59
GFR NON-AFRICAN AMERICAN: 50
GLUCOSE BLD-MCNC: 95 MG/DL (ref 74–109)
HCT VFR BLD CALC: 43.4 % (ref 42–52)
HEMOGLOBIN: 14.4 G/DL (ref 14–18)
MCH RBC QN AUTO: 30.1 PG (ref 27–31)
MCHC RBC AUTO-ENTMCNC: 33.2 G/DL (ref 33–37)
MCV RBC AUTO: 90.6 FL (ref 80–94)
PDW BLD-RTO: 13.2 % (ref 11.5–14.5)
PLATELET # BLD: 293 K/UL (ref 130–400)
PMV BLD AUTO: 10.5 FL (ref 9.4–12.4)
POTASSIUM SERPL-SCNC: 4.1 MMOL/L (ref 3.5–5)
RBC # BLD: 4.79 M/UL (ref 4.7–6.1)
SODIUM BLD-SCNC: 139 MMOL/L (ref 136–145)
WBC # BLD: 10 K/UL (ref 4.8–10.8)

## 2020-11-05 PROCEDURE — 99999 PR OFFICE/OUTPT VISIT,PROCEDURE ONLY: CPT | Performed by: NURSE PRACTITIONER

## 2020-11-06 ENCOUNTER — PREP FOR PROCEDURE (OUTPATIENT)
Dept: VASCULAR SURGERY | Age: 70
End: 2020-11-06

## 2020-11-06 ENCOUNTER — TELEPHONE (OUTPATIENT)
Dept: VASCULAR SURGERY | Age: 70
End: 2020-11-06

## 2020-11-06 RX ORDER — SODIUM CHLORIDE 0.9 % (FLUSH) 0.9 %
10 SYRINGE (ML) INJECTION PRN
Status: CANCELLED | OUTPATIENT
Start: 2020-11-06

## 2020-11-06 RX ORDER — ASPIRIN 81 MG/1
81 TABLET ORAL ONCE
Status: CANCELLED | OUTPATIENT
Start: 2020-11-06 | End: 2020-11-06

## 2020-11-06 RX ORDER — SODIUM CHLORIDE 9 MG/ML
INJECTION, SOLUTION INTRAVENOUS CONTINUOUS
Status: CANCELLED | OUTPATIENT
Start: 2020-11-06

## 2020-11-06 NOTE — TELEPHONE ENCOUNTER
Dave requests that a nurse return their call. Needs to know if he can take meds before procedure. The best time to reach him is Anytime. Thank you.

## 2020-11-06 NOTE — TELEPHONE ENCOUNTER
Spoke with patient to address medications prior to his angiogram with Dr. Sheridan Jones on 11/9/20.

## 2020-11-06 NOTE — H&P (VIEW-ONLY)
Duyen Cesar is a 78 y.o. male evaluated via telephone on 11/6/2020. Duyen Cesar is a 78 y.o. 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, left leg ([facility] 05/15/2020    PVD (peripheral vascular disease) ([facility] 04/18/2019    Atherosclerosis with claudication of extremity ([facility] 07/17/2018    Atherosclerosis of native arteries of extremities with intermittent claudication, bilateral legs (HCC) 09/23/2016    Hyperlipidemia     Hypertension      Current Outpatient Medications   Medication Sig Dispense Refill    clopidogrel (PLAVIX) 75 MG tablet Take 1 tablet by mouth daily 30 tablet 5    sucralfate (CARAFATE) 1 GM tablet Take 1 g by mouth 4 times daily      losartan (COZAAR) 100 MG tablet Take 100 mg by mouth daily      HYDROcodone-acetaminophen (NORCO) 5-325 MG per tablet Take 1 tablet by mouth every 6 hours as needed for Pain. Kurt Granda gabapentin (NEURONTIN) 300 MG capsule Take 300 mg by mouth nightly. .      ondansetron (ZOFRAN-ODT) 4 MG disintegrating tablet Take 4 mg by mouth every 8 hours as needed for Nausea or Vomiting      lisinopril (PRINIVIL;ZESTRIL) 20 MG tablet Take 20 mg by mouth daily      Wheat Dextrin (EQ FIBER POWDER PO) Take by mouth daily      tamsulosin (FLOMAX) 0.4 MG capsule Take 0.4 mg by mouth daily      pantoprazole sodium (PROTONIX) 40 MG PACK packet Take 40 mg by mouth every morning (before breakfast)      Potassium 99 MG TABS Take by mouth daily       acetaminophen (TYLENOL) 325 MG tablet Take 650 mg by mouth every 6 hours as needed for Pain      polyethylene glycol (MIRALAX) powder Take 17 g by mouth daily      omeprazole (PRILOSEC) 20 MG delayed release capsule Take 20 mg by mouth daily      busPIRone (BUSPAR) 15 MG tablet TK 1 T PO BID  3    atorvastatin (LIPITOR) 20 MG tablet TK 1 T PO at night  11    Cholecalciferol (VITAMIN D3) 2000 UNITS TABS TK 1 T PO QD  3    citalopram (CELEXA) 20 MG tablet TAKE 1 TABLET ONCE A DAY  0    ASPIRIN LOW DOSE 81 MG EC tablet TK 1 T PO QD  11     No current facility-administered medications for this visit. Allergies: Patient has no known allergies. Past Medical History:   Diagnosis Date    Blood circulation, collateral     CAD (coronary artery disease)     Depression     GERD with esophagitis     Hyperlipidemia     Hypertension     Neuropathy     Tobacco abuse     Vascular disease, peripheral (HCC)      Past Surgical History:   Procedure Laterality Date    COLONOSCOPY      ENDOSCOPY, COLON, DIAGNOSTIC      FEMORAL BYPASS Left 11/16/2016    LEFT LOWER EXTREMITY ANGIOGRAM WITH ATHERECTOMY, BALLOON ANGIPLASTY, AND STENTING OF LEFT FEMORAL AND POPLITEAL ARTERIES performed by Will Mc MD at [address] [place] [address] Right 1/11/2017    INTRAOPERATIVE ANGIOGRAM RIGHT LOWER EXTREMITY WITH PERCUTANEOUS TRANSLUMINAL BALLOON ANGIOPLASTY AND STENTING OF RIGHT SUPERFICIAL FEMORAL/ POPLITEAL ARTERIES performed by Will Mc MD at [facility] Left 7/17/2018    LEFT COMMON FEMORAL ARTERTY TO LEFT BELOW KNEE POPLITEAL ARTERY BYPASS WITH INSITU GREATER SAPHENOUS VEIN GRAFT WITH INTRAOPERATIVE AND COMPLETION ANGIOGRAM performed by Will Mc MD at [address] [place] [address] VASCULAR SURGERY  10/11/2016    Right CFA 5f sheath, Aortogram with bilateral lower arteriogram, mynx right CFA.  [facility]    VASCULAR SURGERY  11/16/2016    [facility]-Ultrasound guided left PTA 4f sheath, right CFA 5f-7f sheath, left lower arteriograms, placement of left popliteal artery emboshield filter, atherectomy left SFA with 2.1/3.0 jetstream, left popliteal/sfa balloon angioplasty 5x200 desiree, 6x150 lutonix (3), left popliteal/sfa stent 6x200 innova, left SFA stents (supera       The right lower extremity shows plaque in the CFA with no stenosis. The     profunda femoral artery shows no stenosis.     There is plaque throughout the SFA and popliteal artery. The stent in the     superficial femoral artery is patent, however, there is a severe stenosis     in the proximal portion of the stent. Distally, flow is seen without     stenosis.     The left leg shows wide patency of the CFA. There is a mild origin     stenosis of the left PFA. The femoral to PTA in situ bypass is widely     patent without stenosis. Good outflow waveforms are seen in the PTA, ROSEMARIE,     and peroneal artery.          Signature          ----------------------------------------------------------------     Electronically signed by Klarissa Edwards MD(Interpreting     physician) on 10/15/2020 11:48 AM     ----------------------------------------------------------------         Grafts      - The graft originated from the Left Common Femoral to the Left Dist        Popliteal.         +--------------------------------------+----+---+-----+--------------------+    ! Location                              !PSV ! EDV! Ratio!% Stenosis          !    +--------------------------------------+----+---+-----+--------------------+    ! Prox Anastomosis                      !114 !   !     !                    !    +--------------------------------------+----+---+-----+--------------------+    ! Prox Graft                            !82  !   !0.82 !                    !    +--------------------------------------+----+---+-----+--------------------+    ! Mid Graft                             !68  !   !0.82 !                    !    +--------------------------------------+----+---+-----+--------------------+    ! Dist Graft                            !62  !   !0.82 !                    !    +--------------------------------------+----+---+-----+--------------------+    ! Dist Anastomosis                      !98  !   !1.5  !                    ! +--------------------------------------+----+---+-----+--------------------+    Stents      - The stent originated from the Right Mid SFA to the Right Prox Popliteal.         Stent velocities are as follow:Prox Stent: 409 cm/s. Mid Stent: 32 cm/s. Dist    Stent: 48 cm/s.         +-----------------------------+---+---+-----+------------------------------+    ! Location                     !PSV! EDV! Ratio!% Stenosis                    !    +-----------------------------+---+---+-----+------------------------------+    ! Prox Stent                   !409!   !     !                              !    +-----------------------------+---+---+-----+------------------------------+    ! Mid Stent                    !32 !   !0.08 !                              !    +-----------------------------+---+---+-----+------------------------------+    ! Dist Stent                   !48 !   !1.5  !                              !    +-----------------------------+---+---+-----+------------------------------+    Velocities are measured in cm/s ; Diameters are measured in mm         LE Duplex Measurements         +---------------++-----+-----+----+-----------++----+-----+---+------------+    !               ! ! Right!     !Left!           !!    !     !   !            !    +---------------++-----+-----+----+-----------++----+-----+---+------------+    ! Location       !!PSV  !Ratio! EDV ! Wave Desc. !!PSV ! Ratio! EDV! Wave Desc.  !    +---------------++-----+-----+----+-----------++----+-----+---+------------+    ! Common Femoral !!132  !     !8.64!           !!120 !     !   !            !    +---------------++-----+-----+----+-----------++----+-----+---+------------+    ! Prox PFA       !!111  !     !    !           !!173 !     !   !            !    +---------------++-----+-----+----+-----------++----+-----+---+------------+    ! Prox SFA       !!38.8 !     !    !           !!    !     !   !            ! +---------------++-----+-----+----+-----------++----+-----+---+------------+    ! Mid SFA        !!32.8 !0.85 !    !           !!    !     !   !            !    +---------------++-----+-----+----+-----------++----+-----+---+------------+    ! Dist Popliteal !!35.8 !1.09 !    !           !!    !     !   !            !    +---------------++-----+-----+----+-----------++----+-----+---+------------+    ! Dist PTA       !!21   !0.59 !    !           !!56.3!     !   !            !    +---------------++-----+-----+----+-----------++----+-----+---+------------+    ! Prox ROSEMARIE       !!47.1 !1.32 !    !           !!73.3!     !   !            !    +---------------++-----+-----+----+-----------++----+-----+---+------------+    ! Mid Peroneal   !!20.7 !0.58 !    !           !!52.2!     !   !            !    +---------------++-----+-----+----+-----------++----+-----+---+------------+           Impression          Based on ankle-brachial indices and doppler waveforms, the patient has     relatively normal flow to the left lower extremity arterial system at    [facility] on ankle brachial indices and doppler waveforms, the patient has     mildly diminished flow to the right lower extremity arterial system at    [facility]    Kenn Fail is a small drop in ankle-brachial indices in the right lower     extremity(ies) after exercise which would be consistent with claudication.          Signature          ----------------------------------------------------------------     Electronically signed by Wilma Beckford MD(Interpreting     physician) on 10/15/2020 11:42 AM     ----------------------------------------------------------------         Velocities are measured in cm/s ; Diameters are measured in mm         Pressures    +--------------------------------------++--------+-----+----+--------+-----+    !                                      ! ! Right   !     !Left!        !     ! +--------------------------------------++--------+-----+----+--------+-----+    ! Location                              ! ! Pressure! Ratio!    !Pressure! Ratio! +--------------------------------------++--------+-----+----+--------+-----+    ! Ankle PT                              !!120     !0.9  !    !160     !1.2  !    +--------------------------------------++--------+-----+----+--------+-----+    ! DP                                    !!122     !0.92 !    !156     !1.17 ! +--------------------------------------++--------+-----+----+--------+-----+    ! Great Toe                             !!95      !0.71 !    !114     !0.86 !    +--------------------------------------++--------+-----+----+--------+-----+           - Brachial Pressure:Right: 133. Left:130.           - VADIM:Right: 0.92. Left: 1.2.         Plethysmographic Digit Evaluation    +---------++--------+-----+---------------++--------+-----+----------------+    !         ! ! Right   !     ! Left           !!        !     !                !    +---------++--------+-----+---------------++--------+-----+----------------+    ! Location ! !Pressure! Ratio! PPG Wave Form  !!Pressure! Ratio! PPG Wave Form   !    +---------++--------+-----+---------------++--------+-----+----------------+    ! Great Toe!!95      !0.71 !               !!114     !0.86 !                !    +---------++--------+-----+---------------++--------+-----+----------------+         Post Exercise         +------------+----+------------+---------+--------+------------+-----------+    !            !    ! Right       !         ! Left    !            !           !    +------------+----+------------+---------+--------+------------+-----------+    ! Location    !    !Pressure    !Ratio    !        !Pressure    !Ratio      !    +------------+----+------------+---------+--------+------------+-----------+    ! DP          !    !106         !0.75     !        !            !           ! +------------+----+------------+---------+--------+------------+-----------+    ! PTA         !    !            !         !        !164         !1.16       !    +------------+----+------------+---------+--------+------------+-----------+      - Brachial Pressure:Right: 141.           - VADIM:Right: 0.75. Left: 1.16. Assessment/Plan -      1. Athersclerosis of bilateral LE native arteries with claudication  2. IRP right leg      Recommend he continue ASA and statin daily  Recommend smoking cessation  Recommend good BP control  Recommend walking as much as possible  Options were discussed with the patient including continued medical management versus proceeding with angiography and potential intervention for PVD with claudication . Patient has opted to proceed with angiography. Risks have been discussed with the patient including but not limited to MI, death, CVA, bleed, nerve injury, infection, contrast nephropathy, possible need for dialysis, and need for further surgery.       Proceed with aortogram with runoff possible angioplasty/atherectomy/stent

## 2020-11-06 NOTE — H&P
Cb Vail is a 78 y.o. male evaluated via telephone on 11/6/2020. Cb Vail is a 78 y.o. male with the following history as recorded in [facility]:  Patient Active Problem List    Diagnosis Date Noted    Mechanical complication of bypass graft (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 05/15/2020    PVD (peripheral vascular disease) ([facility] 04/18/2019    Atherosclerosis with claudication of extremity ([facility] 07/17/2018    Atherosclerosis of native arteries of extremities with intermittent claudication, bilateral legs (HCC) 09/23/2016    Hyperlipidemia     Hypertension      Current Outpatient Medications   Medication Sig Dispense Refill    clopidogrel (PLAVIX) 75 MG tablet Take 1 tablet by mouth daily 30 tablet 5    sucralfate (CARAFATE) 1 GM tablet Take 1 g by mouth 4 times daily      losartan (COZAAR) 100 MG tablet Take 100 mg by mouth daily      HYDROcodone-acetaminophen (NORCO) 5-325 MG per tablet Take 1 tablet by mouth every 6 hours as needed for Pain. Melene Liter gabapentin (NEURONTIN) 300 MG capsule Take 300 mg by mouth nightly. .      ondansetron (ZOFRAN-ODT) 4 MG disintegrating tablet Take 4 mg by mouth every 8 hours as needed for Nausea or Vomiting      lisinopril (PRINIVIL;ZESTRIL) 20 MG tablet Take 20 mg by mouth daily      Wheat Dextrin (EQ FIBER POWDER PO) Take by mouth daily      tamsulosin (FLOMAX) 0.4 MG capsule Take 0.4 mg by mouth daily      pantoprazole sodium (PROTONIX) 40 MG PACK packet Take 40 mg by mouth every morning (before breakfast)      Potassium 99 MG TABS Take by mouth daily       acetaminophen (TYLENOL) 325 MG tablet Take 650 mg by mouth every 6 hours as needed for Pain      polyethylene glycol (MIRALAX) powder Take 17 g by mouth daily      omeprazole (PRILOSEC) 20 MG delayed release capsule Take 20 mg by mouth daily      busPIRone (BUSPAR) 15 MG tablet TK 1 T PO BID  3    atorvastatin (LIPITOR) 20 MG tablet TK 1 T PO at night  11    Cholecalciferol (VITAMIN D3) 2000 UNITS TABS TK 1 T PO QD  3    citalopram (CELEXA) 20 MG tablet TAKE 1 TABLET ONCE A DAY  0    ASPIRIN LOW DOSE 81 MG EC tablet TK 1 T PO QD  11     No current facility-administered medications for this visit. Allergies: Patient has no known allergies. Past Medical History:   Diagnosis Date    Blood circulation, collateral     CAD (coronary artery disease)     Depression     GERD with esophagitis     Hyperlipidemia     Hypertension     Neuropathy     Tobacco abuse     Vascular disease, peripheral (HCC)      Past Surgical History:   Procedure Laterality Date    COLONOSCOPY      ENDOSCOPY, COLON, DIAGNOSTIC      FEMORAL BYPASS Left 11/16/2016    LEFT LOWER EXTREMITY ANGIOGRAM WITH ATHERECTOMY, BALLOON ANGIPLASTY, AND STENTING OF LEFT FEMORAL AND POPLITEAL ARTERIES performed by Sage Kunz MD at [address] [place] [address] Right 1/11/2017    INTRAOPERATIVE ANGIOGRAM RIGHT LOWER EXTREMITY WITH PERCUTANEOUS TRANSLUMINAL BALLOON ANGIOPLASTY AND STENTING OF RIGHT SUPERFICIAL FEMORAL/ POPLITEAL ARTERIES performed by Sage Kunz MD at [facility] Left 7/17/2018    LEFT COMMON FEMORAL ARTERTY TO LEFT BELOW KNEE POPLITEAL ARTERY BYPASS WITH INSITU GREATER SAPHENOUS VEIN GRAFT WITH INTRAOPERATIVE AND COMPLETION ANGIOGRAM performed by Sage Kunz MD at [address] VASCULAR SURGERY  10/11/2016    Right CFA 5f sheath, Aortogram with bilateral lower arteriogram, mynx right CFA.  [facility]    VASCULAR SURGERY  11/16/2016    [facility]-Ultrasound guided left PTA 4f sheath, right CFA 5f-7f sheath, left lower arteriograms, placement of left popliteal artery emboshield filter, atherectomy left SFA with 2.1/3.0 jetstream, left popliteal/sfa balloon angioplasty 5x200 desiree, 6x150 lutonix (3), left popliteal/sfa stent 6x200 innova, left SFA stents (supera 5.5x150, 5.5x60), left lower extremity arteriograms, mynx right CFA    VASCULAR SURGERY  01/11/2017    [facility]-Ultrasound left CFA 5f-7f 70cm sheath, right lower arteriograms, right ROSEMARIE u/s guided 4f sheath, balloon angioplasty right SFA/Pop occulsion with two 5x100 desiree,Right SFA/pop balloon angioplasty 6x200 desiree, right SFA/pop stents (supera 5.5x150, 5.5x60),Right SFA/pop stents balloon angioplasty 6x150 lutonix, right lower arteriograms, right SFA stent (epic 7x40), right lower arteriograms    VASCULAR SURGERY  01/11/2017    [facility]-CONT-Left CFA proglide closure    VASCULAR SURGERY  05/16/2018    [facility]- Right CFA 5f sheath, aortogram with bilateral lower arteriogram, Mynx right CFA    VASCULAR SURGERY Right 05/17/18 [facility]    1. Percutaneous cannulation right common femoral artery with 5 Albanian glide sheath. 2. Suprarenal abdominal aortogram with bilateral iliofemoralarteriogram. 3. Bilateral lower extremity arteriogram. 4. MYNX closure right common femoral artery puncturesite. Family History   Problem Relation Age of Onset    Diabetes Mother     Heart Disease Mother     Stroke Father      Social History     Tobacco Use    Smoking status: Current Every Day Smoker     Packs/day: 1.00     Years: 20.00     Pack years: 20.00     Types: Cigarettes    Smokeless tobacco: Never Used   Substance Use Topics    Alcohol use: Yes     Comment: occ       Details of this discussion including any medical advice provided: PMH, medications and allergies reviewed. Mr. Stann Councilman is a 79 yo male who has a history of PVD. Today we are following up on his PVD. He reports pain in his legs with walking, right leg is worse than the left. At times he reports pan at rest in his right leg. No non healing wounds noted. He is currently on ASA and a statin daily. He isn't sure if he is taking Plavix. He is still smoking. BLE A'ascan with VADIM's -       Impression          Bilateral lower extremity arterial duplex exam performed.       The right lower extremity shows plaque in the CFA with no stenosis. The     profunda femoral artery shows no stenosis.     There is plaque throughout the SFA and popliteal artery. The stent in the     superficial femoral artery is patent, however, there is a severe stenosis     in the proximal portion of the stent. Distally, flow is seen without     stenosis.     The left leg shows wide patency of the CFA. There is a mild origin     stenosis of the left PFA. The femoral to PTA in situ bypass is widely     patent without stenosis. Good outflow waveforms are seen in the PTA, ROSEMARIE,     and peroneal artery.          Signature          ----------------------------------------------------------------     Electronically signed by Leanne Fabian MD(Interpreting     physician) on 10/15/2020 11:48 AM     ----------------------------------------------------------------         Grafts      - The graft originated from the Left Common Femoral to the Left Dist        Popliteal.         +--------------------------------------+----+---+-----+--------------------+    ! Location                              !PSV ! EDV! Ratio!% Stenosis          !    +--------------------------------------+----+---+-----+--------------------+    ! Prox Anastomosis                      !114 !   !     !                    !    +--------------------------------------+----+---+-----+--------------------+    ! Prox Graft                            !31  !   !0.82 !                    !    +--------------------------------------+----+---+-----+--------------------+    ! Mid Graft                             !68  !   !0.82 !                    !    +--------------------------------------+----+---+-----+--------------------+    ! Dist Graft                            !62  !   !0.82 !                    !    +--------------------------------------+----+---+-----+--------------------+    ! Dist Anastomosis                      !68  !   !1.5  !                    ! +--------------------------------------+----+---+-----+--------------------+    Stents      - The stent originated from the Right Mid SFA to the Right Prox Popliteal.         Stent velocities are as follow:Prox Stent: 409 cm/s. Mid Stent: 32 cm/s. Dist    Stent: 48 cm/s.         +-----------------------------+---+---+-----+------------------------------+    ! Location                     !PSV! EDV! Ratio!% Stenosis                    !    +-----------------------------+---+---+-----+------------------------------+    ! Prox Stent                   !409!   !     !                              !    +-----------------------------+---+---+-----+------------------------------+    ! Mid Stent                    !32 !   !0.08 !                              !    +-----------------------------+---+---+-----+------------------------------+    ! Dist Stent                   !48 !   !1.5  !                              !    +-----------------------------+---+---+-----+------------------------------+    Velocities are measured in cm/s ; Diameters are measured in mm         LE Duplex Measurements         +---------------++-----+-----+----+-----------++----+-----+---+------------+    !               ! ! Right!     !Left!           !!    !     !   !            !    +---------------++-----+-----+----+-----------++----+-----+---+------------+    ! Location       !!PSV  !Ratio! EDV ! Wave Desc. !!PSV ! Ratio! EDV! Wave Desc.  !    +---------------++-----+-----+----+-----------++----+-----+---+------------+    ! Common Femoral !!132  !     !8.64!           !!120 !     !   !            !    +---------------++-----+-----+----+-----------++----+-----+---+------------+    ! Prox PFA       !!111  !     !    !           !!173 !     !   !            !    +---------------++-----+-----+----+-----------++----+-----+---+------------+    ! Prox SFA       !!38.8 !     !    !           !!    !     !   !            ! +---------------++-----+-----+----+-----------++----+-----+---+------------+    ! Mid SFA        !!32.8 !0.85 !    !           !!    !     !   !            !    +---------------++-----+-----+----+-----------++----+-----+---+------------+    ! Dist Popliteal !!35.8 !1.09 !    !           !!    !     !   !            !    +---------------++-----+-----+----+-----------++----+-----+---+------------+    ! Dist PTA       !!21   !0.59 !    !           !!56.3!     !   !            !    +---------------++-----+-----+----+-----------++----+-----+---+------------+    ! Prox ROSEMARIE       !!47.1 !1.32 !    !           !!73.3!     !   !            !    +---------------++-----+-----+----+-----------++----+-----+---+------------+    ! Mid Peroneal   !!20.7 !0.58 !    !           !!52.2!     !   !            !    +---------------++-----+-----+----+-----------++----+-----+---+------------+           Impression          Based on ankle-brachial indices and doppler waveforms, the patient has     relatively normal flow to the left lower extremity arterial system at    [facility] on ankle brachial indices and doppler waveforms, the patient has     mildly diminished flow to the right lower extremity arterial system at    [facility]    Ashley An is a small drop in ankle-brachial indices in the right lower     extremity(ies) after exercise which would be consistent with claudication.          Signature          ----------------------------------------------------------------     Electronically signed by Rachel Kim MD(Interpreting     physician) on 10/15/2020 11:42 AM     ----------------------------------------------------------------         Velocities are measured in cm/s ; Diameters are measured in mm         Pressures    +--------------------------------------++--------+-----+----+--------+-----+    !                                      ! ! Right   !     !Left!        !     ! +--------------------------------------++--------+-----+----+--------+-----+    ! Location                              ! ! Pressure! Ratio!    !Pressure! Ratio! +--------------------------------------++--------+-----+----+--------+-----+    ! Ankle PT                              !!120     !0.9  !    !160     !1.2  !    +--------------------------------------++--------+-----+----+--------+-----+    ! DP                                    !!122     !0.92 !    !156     !1.17 ! +--------------------------------------++--------+-----+----+--------+-----+    ! Great Toe                             !!95      !0.71 !    !114     !0.86 !    +--------------------------------------++--------+-----+----+--------+-----+           - Brachial Pressure:Right: 133. Left:130.           - VADIM:Right: 0.92. Left: 1.2.         Plethysmographic Digit Evaluation    +---------++--------+-----+---------------++--------+-----+----------------+    !         ! ! Right   !     ! Left           !!        !     !                !    +---------++--------+-----+---------------++--------+-----+----------------+    ! Location ! !Pressure! Ratio! PPG Wave Form  !!Pressure! Ratio! PPG Wave Form   !    +---------++--------+-----+---------------++--------+-----+----------------+    ! Great Toe!!95      !0.71 !               !!114     !0.86 !                !    +---------++--------+-----+---------------++--------+-----+----------------+         Post Exercise         +------------+----+------------+---------+--------+------------+-----------+    !            !    ! Right       !         ! Left    !            !           !    +------------+----+------------+---------+--------+------------+-----------+    ! Location    !    !Pressure    !Ratio    !        !Pressure    !Ratio      !    +------------+----+------------+---------+--------+------------+-----------+    ! DP          !    !106         !0.75     !        !            !           ! +------------+----+------------+---------+--------+------------+-----------+    ! PTA         !    !            !         !        !164         !1.16       !    +------------+----+------------+---------+--------+------------+-----------+      - Brachial Pressure:Right: 141.           - VADIM:Right: 0.75. Left: 1.16. Assessment/Plan -      1. Athersclerosis of bilateral LE native arteries with claudication  2. IRP right leg      Recommend he continue ASA and statin daily  Recommend smoking cessation  Recommend good BP control  Recommend walking as much as possible  Options were discussed with the patient including continued medical management versus proceeding with angiography and potential intervention for PVD with claudication . Patient has opted to proceed with angiography. Risks have been discussed with the patient including but not limited to MI, death, CVA, bleed, nerve injury, infection, contrast nephropathy, possible need for dialysis, and need for further surgery.       Proceed with aortogram with runoff possible angioplasty/atherectomy/stent

## 2020-11-09 ENCOUNTER — HOSPITAL ENCOUNTER (OUTPATIENT)
Dept: INTERVENTIONAL RADIOLOGY/VASCULAR | Age: 70
Discharge: HOME | End: 2020-11-09
Payer: MEDICARE

## 2020-11-09 VITALS
BODY MASS INDEX: 25.74 KG/M2 | RESPIRATION RATE: 16 BRPM | OXYGEN SATURATION: 98 % | WEIGHT: 164 LBS | TEMPERATURE: 97.4 F | HEIGHT: 67 IN | SYSTOLIC BLOOD PRESSURE: 148 MMHG | DIASTOLIC BLOOD PRESSURE: 60 MMHG | HEART RATE: 66 BPM

## 2020-11-09 PROCEDURE — 6370000000 HC RX 637 (ALT 250 FOR IP): Performed by: SURGERY

## 2020-11-09 PROCEDURE — 2500000003 HC RX 250 WO HCPCS: Performed by: SURGERY

## 2020-11-09 PROCEDURE — 99152 MOD SED SAME PHYS/QHP 5/>YRS: CPT | Performed by: SURGERY

## 2020-11-09 PROCEDURE — 75716 ARTERY X-RAYS ARMS/LEGS: CPT | Performed by: SURGERY

## 2020-11-09 PROCEDURE — 2580000003 HC RX 258: Performed by: PHYSICIAN ASSISTANT

## 2020-11-09 PROCEDURE — 99153 MOD SED SAME PHYS/QHP EA: CPT | Performed by: SURGERY

## 2020-11-09 PROCEDURE — 6360000002 HC RX W HCPCS: Performed by: SURGERY

## 2020-11-09 PROCEDURE — C1769 GUIDE WIRE: HCPCS

## 2020-11-09 PROCEDURE — 6360000002 HC RX W HCPCS: Performed by: PHYSICIAN ASSISTANT

## 2020-11-09 PROCEDURE — 37225 PR REVSC OPN/PRQ FEM/POP W/ATHRC/ANGIOP SM VSL: CPT | Performed by: SURGERY

## 2020-11-09 PROCEDURE — 6360000004 HC RX CONTRAST MEDICATION: Performed by: SURGERY

## 2020-11-09 PROCEDURE — 37225 HC ATHERECTOMY FEM/POP: CPT | Performed by: SURGERY

## 2020-11-09 RX ORDER — MIDODRINE HYDROCHLORIDE 5 MG/1
TABLET ORAL
COMMUNITY
Start: 2020-10-08

## 2020-11-09 RX ORDER — HYDROCODONE BITARTRATE AND ACETAMINOPHEN 5; 325 MG/1; MG/1
1 TABLET ORAL EVERY 4 HOURS PRN
Status: DISCONTINUED | OUTPATIENT
Start: 2020-11-09 | End: 2020-11-11 | Stop reason: HOSPADM

## 2020-11-09 RX ORDER — MIDAZOLAM HYDROCHLORIDE 1 MG/ML
INJECTION INTRAMUSCULAR; INTRAVENOUS
Status: COMPLETED | OUTPATIENT
Start: 2020-11-09 | End: 2020-11-09

## 2020-11-09 RX ORDER — LIDOCAINE HYDROCHLORIDE 20 MG/ML
INJECTION, SOLUTION INFILTRATION; PERINEURAL
Status: COMPLETED | OUTPATIENT
Start: 2020-11-09 | End: 2020-11-09

## 2020-11-09 RX ORDER — HEPARIN SODIUM 5000 [USP'U]/ML
INJECTION, SOLUTION INTRAVENOUS; SUBCUTANEOUS
Status: COMPLETED | OUTPATIENT
Start: 2020-11-09 | End: 2020-11-09

## 2020-11-09 RX ORDER — SODIUM CHLORIDE 9 MG/ML
INJECTION, SOLUTION INTRAVENOUS CONTINUOUS
Status: DISCONTINUED | OUTPATIENT
Start: 2020-11-09 | End: 2020-11-11 | Stop reason: HOSPADM

## 2020-11-09 RX ORDER — SODIUM CHLORIDE 0.9 % (FLUSH) 0.9 %
10 SYRINGE (ML) INJECTION PRN
Status: DISCONTINUED | OUTPATIENT
Start: 2020-11-09 | End: 2020-11-11 | Stop reason: HOSPADM

## 2020-11-09 RX ORDER — ONDANSETRON 2 MG/ML
4 INJECTION INTRAMUSCULAR; INTRAVENOUS EVERY 8 HOURS PRN
Status: DISCONTINUED | OUTPATIENT
Start: 2020-11-09 | End: 2020-11-11 | Stop reason: HOSPADM

## 2020-11-09 RX ORDER — HYDROCODONE BITARTRATE AND ACETAMINOPHEN 5; 325 MG/1; MG/1
2 TABLET ORAL EVERY 4 HOURS PRN
Status: DISCONTINUED | OUTPATIENT
Start: 2020-11-09 | End: 2020-11-11 | Stop reason: HOSPADM

## 2020-11-09 RX ORDER — ACETAMINOPHEN 325 MG/1
650 TABLET ORAL EVERY 4 HOURS PRN
Status: DISCONTINUED | OUTPATIENT
Start: 2020-11-09 | End: 2020-11-11 | Stop reason: HOSPADM

## 2020-11-09 RX ORDER — METOCLOPRAMIDE 10 MG/1
TABLET ORAL
COMMUNITY
Start: 2020-10-16 | End: 2022-02-16 | Stop reason: CLARIF

## 2020-11-09 RX ORDER — ONDANSETRON 2 MG/ML
INJECTION INTRAMUSCULAR; INTRAVENOUS
Status: COMPLETED | OUTPATIENT
Start: 2020-11-09 | End: 2020-11-09

## 2020-11-09 RX ORDER — IODIXANOL 320 MG/ML
INJECTION, SOLUTION INTRAVASCULAR
Status: COMPLETED | OUTPATIENT
Start: 2020-11-09 | End: 2020-11-09

## 2020-11-09 RX ORDER — FLUTICASONE PROPIONATE 50 MCG
SPRAY, SUSPENSION (ML) NASAL
COMMUNITY
Start: 2020-08-13 | End: 2022-02-16 | Stop reason: CLARIF

## 2020-11-09 RX ORDER — FENTANYL CITRATE 50 UG/ML
INJECTION, SOLUTION INTRAMUSCULAR; INTRAVENOUS
Status: COMPLETED | OUTPATIENT
Start: 2020-11-09 | End: 2020-11-09

## 2020-11-09 RX ORDER — ASPIRIN 81 MG/1
81 TABLET ORAL ONCE
Status: DISCONTINUED | OUTPATIENT
Start: 2020-11-09 | End: 2020-11-11 | Stop reason: HOSPADM

## 2020-11-09 RX ORDER — PANTOPRAZOLE SODIUM 40 MG/1
TABLET, DELAYED RELEASE ORAL
COMMUNITY
Start: 2020-10-15 | End: 2020-11-09

## 2020-11-09 RX ADMIN — ONDANSETRON HYDROCHLORIDE 4 MG: 2 SOLUTION INTRAMUSCULAR; INTRAVENOUS at 08:55

## 2020-11-09 RX ADMIN — CEFAZOLIN SODIUM 1 G: 1 INJECTION, POWDER, FOR SOLUTION INTRAMUSCULAR; INTRAVENOUS at 08:49

## 2020-11-09 RX ADMIN — MIDAZOLAM 1 MG: 1 INJECTION INTRAMUSCULAR; INTRAVENOUS at 09:12

## 2020-11-09 RX ADMIN — MIDAZOLAM 0.5 MG: 1 INJECTION INTRAMUSCULAR; INTRAVENOUS at 09:51

## 2020-11-09 RX ADMIN — SODIUM CHLORIDE: 9 INJECTION, SOLUTION INTRAVENOUS at 07:41

## 2020-11-09 RX ADMIN — HYDROCODONE BITARTRATE AND ACETAMINOPHEN 1 TABLET: 5; 325 TABLET ORAL at 13:10

## 2020-11-09 RX ADMIN — IODIXANOL 70 ML: 320 INJECTION, SOLUTION INTRAVASCULAR at 10:24

## 2020-11-09 RX ADMIN — HEPARIN SODIUM 5000 UNITS: 5000 INJECTION, SOLUTION INTRAVENOUS; SUBCUTANEOUS at 09:13

## 2020-11-09 RX ADMIN — MIDAZOLAM 0.5 MG: 1 INJECTION INTRAMUSCULAR; INTRAVENOUS at 10:07

## 2020-11-09 RX ADMIN — HEPARIN SODIUM 5000 UNITS: 5000 INJECTION, SOLUTION INTRAVENOUS; SUBCUTANEOUS at 09:18

## 2020-11-09 RX ADMIN — LIDOCAINE HYDROCHLORIDE 10 ML: 20 INJECTION, SOLUTION INFILTRATION; PERINEURAL at 09:13

## 2020-11-09 RX ADMIN — FENTANYL CITRATE 25 MCG: 50 INJECTION, SOLUTION INTRAMUSCULAR; INTRAVENOUS at 09:12

## 2020-11-09 RX ADMIN — FENTANYL CITRATE 25 MCG: 50 INJECTION, SOLUTION INTRAMUSCULAR; INTRAVENOUS at 09:51

## 2020-11-09 ASSESSMENT — PAIN SCALES - GENERAL: PAINLEVEL_OUTOF10: 8

## 2020-11-19 ENCOUNTER — TELEPHONE (OUTPATIENT)
Dept: VASCULAR SURGERY | Age: 70
End: 2020-11-19

## 2020-12-03 LAB — SARS-COV-2, NAA: NOT DETECTED

## 2020-12-31 ENCOUNTER — TELEPHONE (OUTPATIENT)
Dept: VASCULAR SURGERY | Age: 70
End: 2020-12-31

## 2020-12-31 NOTE — TELEPHONE ENCOUNTER
[facility] called requesting surgical clearance. One was done in October, and they are faxing the form today. The pt's procedure is scheduled for 1/4/20.

## 2022-02-16 ENCOUNTER — HOSPITAL ENCOUNTER (OUTPATIENT)
Dept: VASCULAR LAB | Age: 72
Discharge: HOME | End: 2022-02-16
Payer: MEDICARE

## 2022-02-16 ENCOUNTER — OFFICE VISIT (OUTPATIENT)
Dept: VASCULAR SURGERY | Age: 72
End: 2022-02-16
Payer: MEDICARE

## 2022-02-16 VITALS
SYSTOLIC BLOOD PRESSURE: 122 MMHG | RESPIRATION RATE: 18 BRPM | HEART RATE: 77 BPM | TEMPERATURE: 97.2 F | DIASTOLIC BLOOD PRESSURE: 77 MMHG | OXYGEN SATURATION: 99 %

## 2022-02-16 DIAGNOSIS — I70.213 ATHEROSCLEROSIS OF NATIVE ARTERIES OF EXTREMITIES WITH INTERMITTENT CLAUDICATION, BILATERAL LEGS (HCC): ICD-10-CM

## 2022-02-16 DIAGNOSIS — I70.213 ATHEROSCLEROSIS OF NATIVE ARTERIES OF EXTREMITIES WITH INTERMITTENT CLAUDICATION, BILATERAL LEGS (HCC): Primary | ICD-10-CM

## 2022-02-16 PROCEDURE — 93925 LOWER EXTREMITY STUDY: CPT

## 2022-02-16 PROCEDURE — 99214 OFFICE O/P EST MOD 30 MIN: CPT | Performed by: PHYSICIAN ASSISTANT

## 2022-02-16 PROCEDURE — 93922 UPR/L XTREMITY ART 2 LEVELS: CPT

## 2022-02-16 RX ORDER — TAMSULOSIN HYDROCHLORIDE 0.4 MG/1
0.4 CAPSULE ORAL DAILY
COMMUNITY

## 2022-02-16 RX ORDER — M-VIT,TX,IRON,MINS/CALC/FOLIC 27MG-0.4MG
1 TABLET ORAL DAILY
COMMUNITY

## 2022-02-16 RX ORDER — DOCUSATE SODIUM 100 MG/1
100 CAPSULE, LIQUID FILLED ORAL 2 TIMES DAILY
COMMUNITY

## 2022-02-16 NOTE — PROGRESS NOTES
Patient Care Team:  Leland Brice as PCP - Pierce Daniels MD as Consulting Physician (Vascular Surgery)      History and Physical  Mr. King Spicer is a 62-year-old male who has a past medical history that includes CAD, GERD, hyperlipidemia, hypertension, neuropathy, tobacco abuse and peripheral vascular disease. Today we are following up for his PVD. He comes in today with complaints of bilateral calf claudication over the last 2 months that has progressively gotten worse over the last month. He reports the left leg is worse than the right. He denies any ischemic rest pain or nonhealing wounds on his lower extremities. Currently he is on aspirin 81 mg, Plavix 75 mg, Lipitor 20 mg daily.   He is still smoking    Shankar Lara is a 73 y.o. male with the following history reviewed and recorded in [facility]:  Patient Active Problem List    Diagnosis Date Noted    Mechanical complication of bypass graft (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, left leg ([facility] 05/15/2020    PVD (peripheral vascular disease) ([facility] 04/18/2019    Atherosclerosis with claudication of extremity ([facility] 07/17/2018    Atherosclerosis of native artery of both lower extremities with intermittent claudication (HCC) 09/23/2016    Hyperlipidemia     Hypertension      Current Outpatient Medications   Medication Sig Dispense Refill    Multiple Vitamins-Minerals (THERAPEUTIC MULTIVITAMIN-MINERALS) tablet Take 1 tablet by mouth daily      tamsulosin (FLOMAX) 0.4 MG capsule Take 0.4 mg by mouth daily      docusate sodium (COLACE) 100 MG capsule Take 100 mg by mouth 2 times daily      midodrine (PROAMATINE) 5 MG tablet       clopidogrel (PLAVIX) 75 MG tablet Take 1 tablet by mouth daily 30 tablet 5    sucralfate (CARAFATE) 1 GM tablet Take 1 g by mouth 4 times daily      losartan (COZAAR) 100 MG tablet Take 100 mg by mouth daily      pantoprazole sodium (PROTONIX) 40 MG PACK packet Take 40 mg by mouth every morning (before breakfast)      busPIRone (BUSPAR) 15 MG tablet TK 1 T PO BID  3    atorvastatin (LIPITOR) 20 MG tablet TK 1 T PO at night  11    Cholecalciferol (VITAMIN D3) 2000 UNITS TABS TK 1 T PO QD  3    citalopram (CELEXA) 20 MG tablet TAKE 1 TABLET ONCE A DAY  0    ASPIRIN LOW DOSE 81 MG EC tablet TK 1 T PO QD  11     No current facility-administered medications for this visit. Allergies: Patient has no known allergies. Past Medical History:   Diagnosis Date    Blood circulation, collateral     CAD (coronary artery disease)     Depression     GERD with esophagitis     Hyperlipidemia     Hypertension     Neuropathy     Tobacco abuse     Vascular disease, peripheral ([address]      Past Surgical History:   Procedure Laterality Date    COLONOSCOPY      ENDOSCOPY, COLON, DIAGNOSTIC      FEMORAL BYPASS Left 11/16/2016    LEFT LOWER EXTREMITY ANGIOGRAM WITH ATHERECTOMY, BALLOON ANGIPLASTY, AND STENTING OF LEFT FEMORAL AND POPLITEAL ARTERIES performed by Delicia Evans MD at [address] [place] [address] Right 01/11/2017    INTRAOPERATIVE ANGIOGRAM RIGHT LOWER EXTREMITY WITH PERCUTANEOUS TRANSLUMINAL BALLOON ANGIOPLASTY AND STENTING OF RIGHT SUPERFICIAL FEMORAL/ POPLITEAL ARTERIES performed by Delicia Evans MD at [facility] Left 07/17/2018    LEFT COMMON FEMORAL ARTERTY TO LEFT BELOW KNEE POPLITEAL ARTERY BYPASS WITH INSITU GREATER SAPHENOUS VEIN GRAFT WITH INTRAOPERATIVE AND COMPLETION ANGIOGRAM performed by Delicia Evans MD at [address] [place] [address] VASCULAR SURGERY  10/11/2016    Right CFA 5f sheath, Aortogram with bilateral lower arteriogram, mynx right CFA.  [facility]    VASCULAR SURGERY  11/16/2016    [facility]-Ultrasound guided left PTA 4f sheath, right CFA 5f-7f sheath, left lower arteriograms, placement of left popliteal artery emboshield filter, atherectomy left SFA with 2.1/3.0 jetstream, left popliteal/sfa balloon angioplasty 5x200 desiree, 6x150 lutonix (3), left popliteal/sfa stent 6x200 innova, left SFA stents (supera 5.5x150, 5.5x60), left lower extremity arteriograms, mynx right CFA    VASCULAR SURGERY  01/11/2017    [facility]-Ultrasound left CFA 5f-7f 70cm sheath, right lower arteriograms, right ROSEMARIE u/s guided 4f sheath, balloon angioplasty right SFA/Pop occulsion with two 5x100 desiree,Right SFA/pop balloon angioplasty 6x200 desiree, right SFA/pop stents (supera 5.5x150, 5.5x60),Right SFA/pop stents balloon angioplasty 6x150 lutonix, right lower arteriograms, right SFA stent (epic 7x40), right lower arteriograms    VASCULAR SURGERY  01/11/2017    [facility]-Left CFA proglide closure    VASCULAR SURGERY  05/16/2018    [facility]- Right CFA 5f sheath, aortogram with bilateral lower arteriogram, Mynx right CFA    VASCULAR SURGERY Right 05/17/18 [facility]    1. Percutaneous cannulation right common femoral artery with 5 [place] glide sheath. 2. Suprarenal abdominal aortogram with bilateral iliofemoralarteriogram. 3. Bilateral lower extremity arteriogram. 4. MYNX closure right common femoral artery puncturesite.  VASCULAR SURGERY  11/09/2020    [facility]. Ultrasound-guided cannulation left common femoral artery with 5 [place] and later 7 [place] destination sheath, Selective right lower extremity arteriograms, Placement of right popliteal artery distal embolic protection device (emboshield filter),    VASCULAR SURGERY  11/09/2020    [facility]. [facility].  Atherectomy of right superficial femoral artery in-stent stenosis (jetstream 2.1/3.0), Balloon angioplasty right superficial femoral artery in-stent stenosis ( 6 mm x 40 mm, Lutonix 6 mm x 60 mm drug-coated balloon), Completion right lower extremity arteriograms, Left lower extremity arteriograms, Mynx closure left common femoral artery puncture site     Family History   Problem Relation Age of Onset    Diabetes Mother     Heart Disease Mother     Stroke Father Social History     Tobacco Use    Smoking status: Current Every Day Smoker     Packs/day: 1.00     Years: 20.00     Pack years: 20.00     Types: Cigarettes    Smokeless tobacco: Never Used   Substance Use Topics    Alcohol use: Yes     Comment: occ       Review of Systems    Constitutional  no significant activity change, appetite change, or unexpected weight change. No fever or chills. No diaphoresis or significant fatigue. HENT  no significant rhinorrhea or epistaxis. No tinnitus or significant hearing loss. Eyes  no sudden vision change or amaurosis. Respiratory  no significant shortness of breath, wheezing, or stridor. No apnea, cough, or chest tightness associated with shortness of breath. Cardiovascular  no chest pain, syncope, or significant dizziness. No palpitations or significant leg swelling. (see HPI)  Gastrointestinal  no abdominal swelling or pain. No blood in stool. No severe constipation, diarrhea, nausea, or vomiting. Genitourinary  No difficulty urinating, dysuria, frequency, or urgency. No flank pain or hematuria. Musculoskeletal  no back pain, gait disturbance, or myalgia. Skin  no color change, rash, pallor, or new wound. Neurologic  no dizziness, facial asymmetry, or light headedness. No seizures. No speech difficulty or lateralizing weakness. Hematologic  no easy bruising or excessive bleeding. Psychiatric  no severe anxiety or nervousness. No confusion. All other review of systems are negative. Physical Exam    /77 Comment: left  Pulse 77   Temp 97.2 °F (36.2 °C)   Resp 18   SpO2 99%     Constitutional  well developed, well nourished. No diaphoresis or acute distress. HENT  head normocephalic. Right external ear canal appears normal.  Left external ear canal appears normal.  Septum appears midline. Eyes  conjunctiva normal.  EOMS normal.  No exudate. No icterus. Neck- ROM appears normal, no tracheal deviation.   Cardiovascular  Regular rate and rhythm. Heart sounds are normal.  No murmur, rub, or gallop. Carotid pulses are 2+ to palpation bilaterally without bruit. Extremities - Radial and ulnar pulses are 2+ to palpation bilaterally. Femoral pulses are palpable. DP and PT pulses are palpable. Feet are warm and without wounds. No cyanosis, clubbing, or significant edema. No signs atheroembolic event. Pulmonary  effort appears normal.  No respiratory distress. Lungs - Breath sounds normal. No wheezes or rales. GI - Abdomen  soft, non tender, bowel sounds X 4 quadrants. No guarding or rebound tenderness. No distension or palpable mass. Genitourinary  deferred. Musculoskeletal  ROM appears normal.  No significant edema. Neurologic  alert and oriented X 3. Physiologic. Skin  warm, dry, and intact. No rash, erythema, or pallor. Psychiatric  mood, affect, and behavior appear normal.  Judgment and thought processes appear normal.    Risk factors for atherosclerosis of all vascular beds have been reviewed with the patient including:  Family history, tobacco abuse in all forms, elevated cholesterol, hyperlipidemia, and diabetes. Assessment      1. Atherosclerosis of native arteries of extremities with intermittent claudication, bilateral legs (HCC)          Plan      We recommend he continue aspirin 81 mg, Plavix 75 mg and Lipitor 20 mg daily  Recommend smoking cessation  We will obtain a bilateral a scan with ABIs to assess for any significant arterial insufficiency or stenosis of his stents. After he reviewed the imaging we will call him with results and further recommendations.

## 2022-03-09 ENCOUNTER — SCHEDULED TELEPHONE ENCOUNTER (OUTPATIENT)
Dept: VASCULAR SURGERY | Age: 72
End: 2022-03-09
Payer: MEDICARE

## 2022-03-09 DIAGNOSIS — I73.9 PVD (PERIPHERAL VASCULAR DISEASE) (HCC): Primary | ICD-10-CM

## 2022-03-09 PROCEDURE — 99441 PR PHYS/QHP TELEPHONE EVALUATION 5-10 MIN: CPT | Performed by: PHYSICIAN ASSISTANT

## 2022-03-09 NOTE — TELEPHONE ENCOUNTER
Sylvia Bajwa is a 73 y.o. male evaluated via telephone on 3/9/2022. Consent:  He and/or health care decision maker is aware that that he may receive a bill for this telephone service, which includes applicable co-pays, depending on his insurance coverage, and has provided verbal consent to proceed. Sylvia Bajwa is a 73 y.o. 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, [facility] leg [facility] 05/15/2020    PVD (peripheral vascular disease) ([facility] 04/18/2019    Atherosclerosis with claudication of extremity ([facility] 07/17/2018    Atherosclerosis of native artery of both lower extremities with intermittent claudication ([facility] 09/23/2016    Hyperlipidemia     Hypertension      Current Outpatient Medications   Medication Sig Dispense Refill    Multiple Vitamins-Minerals (THERAPEUTIC MULTIVITAMIN-MINERALS) tablet Take 1 tablet by mouth daily      tamsulosin (FLOMAX) 0.4 MG capsule Take 0.4 mg by mouth daily      docusate sodium (COLACE) 100 MG capsule Take 100 mg by mouth 2 times daily      midodrine (PROAMATINE) 5 MG tablet       clopidogrel (PLAVIX) 75 MG tablet Take 1 tablet by mouth daily 30 tablet 5    sucralfate (CARAFATE) 1 GM tablet Take 1 g by mouth 4 times daily      losartan (COZAAR) 100 MG tablet Take 100 mg by mouth daily      pantoprazole sodium (PROTONIX) 40 MG PACK packet Take 40 mg by mouth every morning (before breakfast)      busPIRone (BUSPAR) 15 MG tablet TK 1 T PO BID  3    atorvastatin (LIPITOR) 20 MG tablet TK 1 T PO at night  11    Cholecalciferol (VITAMIN D3) 2000 UNITS TABS TK 1 T PO QD  3    citalopram (CELEXA) 20 MG tablet TAKE 1 TABLET ONCE A DAY  0    ASPIRIN LOW DOSE 81 MG EC tablet TK 1 T PO QD  11     No current facility-administered medications for this visit. Allergies: Patient has no known allergies. Past Medical History:   Diagnosis Date    Blood circulation, collateral     CAD (coronary artery disease)     Depression     GERD with esophagitis     Hyperlipidemia     Hypertension     Neuropathy     Tobacco abuse     Vascular disease, peripheral ([address]      Past Surgical History:   Procedure Laterality Date    COLONOSCOPY      ENDOSCOPY, COLON, DIAGNOSTIC      FEMORAL BYPASS Left 11/16/2016    LEFT LOWER EXTREMITY ANGIOGRAM WITH ATHERECTOMY, BALLOON ANGIPLASTY, AND STENTING OF LEFT FEMORAL AND POPLITEAL ARTERIES performed by Faizan Rodriguez MD at [facility] Right 01/11/2017    INTRAOPERATIVE ANGIOGRAM RIGHT LOWER EXTREMITY WITH PERCUTANEOUS TRANSLUMINAL BALLOON ANGIOPLASTY AND STENTING OF RIGHT SUPERFICIAL FEMORAL/ POPLITEAL ARTERIES performed by Faizan Rodriguez MD at [facility] Left 07/17/2018    LEFT COMMON FEMORAL ARTERTY TO LEFT BELOW KNEE POPLITEAL ARTERY BYPASS WITH INSITU GREATER SAPHENOUS VEIN GRAFT WITH INTRAOPERATIVE AND COMPLETION ANGIOGRAM performed by Faizan Rodriguez MD at [facility] VASCULAR SURGERY  10/11/2016    Right CFA 5f sheath, Aortogram with bilateral lower arteriogram, mynx right CFA.  [facility]    VASCULAR SURGERY  11/16/2016    [facility]-Ultrasound guided left PTA 4f sheath, right CFA 5f-7f sheath, left lower arteriograms, placement of left popliteal artery emboshield filter, atherectomy left SFA with 2.1/3.0 jetstream, left popliteal/sfa balloon angioplasty 5x200 desiree, 6x150 lutonix (3), left popliteal/sfa stent 6x200 innova, left SFA stents (supera 5.5x150, 5.5x60), left lower extremity arteriograms, mynx right CFA    VASCULAR SURGERY  01/11/2017    [facility]-Ultrasound left CFA 5f-7f 70cm sheath, right lower arteriograms, right ROSEMARIE u/s guided 4f sheath, balloon angioplasty right SFA/Pop occulsion with two 5x100 desiree,Right SFA/pop balloon angioplasty discussion including any medical advice provided: Mr. Serge Smart is a 57-year-old male who has a past medical history that includes CAD, hypertension, hyperlipidemia, and PVD. Today we are following up on his PVD. Currently he is on aspirin 81 mg Plavix 75 mg and Lipitor 20 mg daily. Today he denies any lifestyle limiting claudication, ischemic rest pain or nonhealing wounds on his lower extremities. BLE A'scan with VADIM's -   Impression        Right mid SFA to popliteal artery stent is patent. Tibial flow is good.    There appears to be a patent left femoral-popliteal artery bypass graft.        Signature        ----------------------------------------------------------------    Electronically signed by Eliza RodriguezInterpreting    physician) on 02/17/2022 05:19 PM    ----------------------------------------------------------------       Grafts     - The graft originated from the Left Common Femoral to the Left Dist       Popliteal.       +--------------------------------------+----+---+-----+--------------------+   ! Location                              !PSV ! EDV! Ratio!% Stenosis          !   +--------------------------------------+----+---+-----+--------------------+   ! Prox Anastomosis                      !160 !   !     !                    !   +--------------------------------------+----+---+-----+--------------------+   ! Prox Graft                            !171 !   !1.07 !                    !   +--------------------------------------+----+---+-----+--------------------+   ! Mid Graft                             ![de-identified]  !   !0.47 !                    !   +--------------------------------------+----+---+-----+--------------------+   ! Dist Graft                            !75  !   !0.94 !                    !   +--------------------------------------+----+---+-----+--------------------+   ! Dist Anastomosis                      !54  !   !0.72 !                    ! +--------------------------------------+----+---+-----+--------------------+   Stents     - The stent originated from the Right Mid SFA to the Right Popliteal.       Stent velocities are as follow:Prox Stent: 92 cm/s. Mid Stent: 97 cm/s. Dist   Stent: 87 cm/s.       +--------------------------------------+---+---+-----+---------------------+   ! Location                              !PSV! EDV! Ratio!% Stenosis           !   +--------------------------------------+---+---+-----+---------------------+   ! Inflow Artery                         !123!   !     !                     !   +--------------------------------------+---+---+-----+---------------------+   ! Prox Stent                            !92 !   !0.75 !                     !   +--------------------------------------+---+---+-----+---------------------+   ! Mid Stent                             !80 !   !1.05 !                     !   +--------------------------------------+---+---+-----+---------------------+   ! Dist Stent                            !87 !   !0.9  !                     !   +--------------------------------------+---+---+-----+---------------------+   Velocities are measured in cm/s ; Diameters are measured in mm       LE Duplex Measurements       +---------------++-----+-----+----+-----------++----+-----+---+------------+   !               ! ! Right!     !Left!           !!    !     !   !            !   +---------------++-----+-----+----+-----------++----+-----+---+------------+   ! Location       !!PSV  !Ratio! EDV ! Wave Desc. !!PSV ! Ratio! EDV! Wave Desc.  !   +---------------++-----+-----+----+-----------++----+-----+---+------------+   ! Common Femoral !!128  !     !    !           !!180 !     !   !            !   +---------------++-----+-----+----+-----------++----+-----+---+------------+   ! Prox PFA       !!296  !     !    !           !!279 !     !   !            !   +---------------++-----+-----+----+-----------++----+-----+---+------------+ !Prox SFA       !!147  !     !    !           !!    !     !   !            !   +---------------++-----+-----+----+-----------++----+-----+---+------------+   ! Mid SFA        !!76.5 !0.52 !    !           !!    !     !   !            !   +---------------++-----+-----+----+-----------++----+-----+---+------------+   ! Dist Popliteal !!77.1 !1.01 !    !           !!    !     !   !            !   +---------------++-----+-----+----+-----------++----+-----+---+------------+   ! Dist PTA       !!42   !0.54 !    !           !!58.1!     !   !            !   +---------------++-----+-----+----+-----------++----+-----+---+------------+   ! Prox ROSEMARIE       !!87.6 !1.14 !    !           !!114 !     !   !            !   +---------------++-----+-----+----+-----------++----+-----+---+------------+   ! Mid Peroneal   !!40.8 !0.53 !    !           !!50. 2!     !   !            !   +---------------++-----+-----+----+-----------++----+-----+---+------------+         Impression        Based on ankle-brachial indices and doppler waveforms, the patient has    relatively normal flow to the bilateral lower extremity arterial system at   [facility]   Ninfa Palumbo is no significant drop in the bilateral ankle-brachial indices after    exercise.        Signature        ----------------------------------------------------------------    Electronically signed by Noah Acosta    physician) on 02/17/2022 12:55 PM    ----------------------------------------------------------------       Velocities are measured in cm/s ; Diameters are measured in mm       Pressures   +--------------------------------------++--------+-----+----+--------+-----+   !                                      ! ! Right   !     !Left!        !     !   +--------------------------------------++--------+-----+----+--------+-----+   ! Location                              ! ! Pressure! Ratio!    !Pressure! Ratio! Patient who has not had a related appointment within my department in the past 7 days or scheduled within the next 24 hours. Patient identification was verified at the start of the visit: Yes    Total Time: minutes: 5-10 minutes    Mal Good was evaluated through a synchronous (real-time) audio encounter. The patient was located at home in a state where the provider was licensed to provide care.     Note: not billable if this call serves to triage the patient into an appointment for the relevant concern      Maximo Barry PA-C

## 2022-06-07 ENCOUNTER — TELEPHONE (OUTPATIENT)
Dept: VASCULAR SURGERY | Age: 72
End: 2022-06-07

## 2022-06-07 NOTE — TELEPHONE ENCOUNTER
Patient called to ask for us to send his Vascular medical records to Dr. Audie Edward in [facility]. This has been done.

## 2022-09-19 ENCOUNTER — OFFICE VISIT (OUTPATIENT)
Dept: VASCULAR SURGERY | Age: 72
End: 2022-09-19
Payer: MEDICARE

## 2022-09-19 ENCOUNTER — HOSPITAL ENCOUNTER (OUTPATIENT)
Dept: VASCULAR LAB | Age: 72
Discharge: HOME | End: 2022-09-19
Payer: MEDICARE

## 2022-09-19 VITALS
RESPIRATION RATE: 18 BRPM | OXYGEN SATURATION: 99 % | DIASTOLIC BLOOD PRESSURE: 91 MMHG | TEMPERATURE: 96.6 F | HEART RATE: 61 BPM | SYSTOLIC BLOOD PRESSURE: 161 MMHG

## 2022-09-19 DIAGNOSIS — I73.9 PVD (PERIPHERAL VASCULAR DISEASE) (HCC): ICD-10-CM

## 2022-09-19 DIAGNOSIS — I73.9 PVD (PERIPHERAL VASCULAR DISEASE) (HCC): Primary | ICD-10-CM

## 2022-09-19 PROCEDURE — 93925 LOWER EXTREMITY STUDY: CPT

## 2022-09-19 PROCEDURE — 93922 UPR/L XTREMITY ART 2 LEVELS: CPT

## 2022-09-19 PROCEDURE — 1123F ACP DISCUSS/DSCN MKR DOCD: CPT | Performed by: PHYSICIAN ASSISTANT

## 2022-09-19 PROCEDURE — 99214 OFFICE O/P EST MOD 30 MIN: CPT | Performed by: PHYSICIAN ASSISTANT

## 2022-09-19 RX ORDER — GABAPENTIN 300 MG/1
CAPSULE ORAL
COMMUNITY
Start: 2022-08-09

## 2022-09-19 NOTE — PROGRESS NOTES
Patient Care Team:  AVI Rose - JORGE as PCP - Lavelle Soni MD as Consulting Physician (Vascular Surgery)      History and Physical  Mr. Wilma Burnett is a 51-year-old male who has a history of CAD, depression, GERD, hyperlipidemia, hypertension, neuropathy, tobacco abuse and PVD. Today we are following up for his PVD. Currently he is on Plavix 75 mg aspirin 81 mg and atorvastatin 20 mg daily. He denies any lifestyle limiting claudication, ischemic rest pain or nonhealing wounds on his lower extremities. He reports that at times he develops intermittent left calf pain that is sharp in nature. He is on gabapentin 300 mg at bedtime. He is still smoking 1 pack of cigarettes per day.      Keely Mccabe is a 73 y.o. 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, left leg ([facility] 05/15/2020    PVD (peripheral vascular disease) ([facility] 04/18/2019    Atherosclerosis with claudication of extremity ([facility] 07/17/2018    Atherosclerosis of native artery of both lower extremities with intermittent claudication (HCC) 09/23/2016    Hyperlipidemia     Hypertension      Current Outpatient Medications   Medication Sig Dispense Refill    gabapentin (NEURONTIN) 300 MG capsule       Multiple Vitamins-Minerals (THERAPEUTIC MULTIVITAMIN-MINERALS) tablet Take 1 tablet by mouth daily      tamsulosin (FLOMAX) 0.4 MG capsule Take 0.4 mg by mouth daily      docusate sodium (COLACE) 100 MG capsule Take 100 mg by mouth 2 times daily      midodrine (PROAMATINE) 5 MG tablet       clopidogrel (PLAVIX) 75 MG tablet Take 1 tablet by mouth daily 30 tablet 5    sucralfate (CARAFATE) 1 GM tablet Take 1 g by mouth 4 times daily      losartan (COZAAR) 100 MG tablet Take 100 mg by mouth daily      pantoprazole sodium (PROTONIX) 40 MG PACK packet Take 40 mg by mouth every morning (before breakfast)      busPIRone (BUSPAR) 15 MG tablet TK 1 T PO BID  3    atorvastatin (LIPITOR) 20 MG tablet TK 1 T PO at night  11    Cholecalciferol (VITAMIN D3) 2000 UNITS TABS TK 1 T PO QD  3    citalopram (CELEXA) 20 MG tablet TAKE 1 TABLET ONCE A DAY  0    ASPIRIN LOW DOSE 81 MG EC tablet TK 1 T PO QD  11     No current facility-administered medications for this visit. Allergies: Patient has no known allergies. Past Medical History:   Diagnosis Date    Blood circulation, collateral     CAD (coronary artery disease)     Depression     GERD with esophagitis     Hyperlipidemia     Hypertension     Neuropathy     Tobacco abuse     Vascular disease, peripheral ([address]      Past Surgical History:   Procedure Laterality Date    COLONOSCOPY      ENDOSCOPY, COLON, DIAGNOSTIC      FEMORAL BYPASS Left 11/16/2016    LEFT LOWER EXTREMITY ANGIOGRAM WITH ATHERECTOMY, BALLOON ANGIPLASTY, AND STENTING OF LEFT FEMORAL AND POPLITEAL ARTERIES performed by Danielle Leung MD at [address] [place] [address] Right 01/11/2017    INTRAOPERATIVE ANGIOGRAM RIGHT LOWER EXTREMITY WITH PERCUTANEOUS TRANSLUMINAL BALLOON ANGIOPLASTY AND STENTING OF RIGHT SUPERFICIAL FEMORAL/ POPLITEAL ARTERIES performed by Danielle Leung MD at [facility] 07/17/2018    LEFT COMMON FEMORAL ARTERTY TO LEFT BELOW KNEE POPLITEAL ARTERY BYPASS WITH INSITU GREATER SAPHENOUS VEIN GRAFT WITH INTRAOPERATIVE AND COMPLETION ANGIOGRAM performed by Danielle Leung MD at [address] [place] [address]  10/11/2016    Right CFA 5f sheath, Aortogram with bilateral lower arteriogram, mynx right CFA.  [facility]    VASCULAR SURGERY  11/16/2016    [facility]-Ultrasound guided left PTA 4f sheath, right CFA 5f-7f sheath, left lower arteriograms, placement of left popliteal artery emboshield filter, atherectomy left SFA with 2.1/3.0 jetstream, left popliteal/sfa Use    Smoking status: Every Day     Packs/day: 1.00     Years: 20.00     Pack years: 20.00     Types: Cigarettes    Smokeless tobacco: Never   Substance Use Topics    Alcohol use: Yes     Comment: occ       Review of Systems    Constitutional - no significant activity change, appetite change, or unexpected weight change. No fever or chills. No diaphoresis or significant fatigue. HENT - no significant rhinorrhea or epistaxis. No tinnitus or significant hearing loss. Eyes - no sudden vision change or amaurosis. Respiratory - no significant shortness of breath, wheezing, or stridor. No apnea, cough, or chest tightness associated with shortness of breath. Cardiovascular - no chest pain, syncope, or significant dizziness. No palpitations or significant leg swelling. (see HPI)  Gastrointestinal - no abdominal swelling or pain. No blood in stool. No severe constipation, diarrhea, nausea, or vomiting. Genitourinary - No difficulty urinating, dysuria, frequency, or urgency. No flank pain or hematuria. Musculoskeletal - no back pain, gait disturbance, or myalgia. Skin - no color change, pallor, or new wound. He reports an itchy rash on his palms and soles of his feet  Neurologic - no dizziness, facial asymmetry, or light headedness. No seizures. No speech difficulty or lateralizing weakness. Hematologic - no easy bruising or excessive bleeding. Psychiatric - no severe anxiety or nervousness. No confusion. All other review of systems are negative. Physical Exam    BP (!) 161/91 (Site: Right Upper Arm, Position: Sitting, Cuff Size: Medium Adult)   Pulse 61   Temp (!) 96.6 °F (35.9 °C) (Oral)   Resp 18   SpO2 99%     Constitutional - well developed, well nourished. No diaphoresis or acute distress. HENT - head normocephalic. Right external ear canal appears normal.  Left external ear canal appears normal.  Septum appears midline. Eyes - conjunctiva normal.  EOMS normal.  No exudate.   No icterus. Neck- ROM appears normal, no tracheal deviation. Cardiovascular - Regular rate and rhythm. Heart sounds are normal.  No murmur, rub, or gallop. Carotid pulses are 2+ to palpation bilaterally without bruit. Extremities - Radial and ulnar pulses are 2+ to palpation bilaterally. Femoral pulses are palpable. DP and PT pulses are palpable. No cyanosis, clubbing, or significant edema. No signs atheroembolic event. Pustules and desquamation palms and soles. Pulmonary - effort appears normal.  No respiratory distress. Lungs - Breath sounds normal. No wheezes or rales. GI - Abdomen - soft, non tender, bowel sounds X 4 quadrants. No guarding or rebound tenderness. No distension or palpable mass. Genitourinary - deferred. Musculoskeletal - ROM appears normal.  No significant edema. Neurologic - alert and oriented X 3. Physiologic. Skin - warm, dry, and intact. No rash, erythema, or pallor. Psychiatric - mood, affect, and behavior appear normal.  Judgment and thought processes appear normal.    Risk factors for atherosclerosis of all vascular beds have been reviewed with the patient including:  Family history, tobacco abuse in all forms, elevated cholesterol, hyperlipidemia, and diabetes. Bilateral A-scan with VADIM's - 9/19/22  Right SFA/pop with 2 artery stent patent, left femoropopliteal bypass patent  Right VADIM 1.01, Left VADIM 1.1. Individual films reviewed: Yes. Test results were reviewed and compared to previous study without significant changes noted. Results were reviewed with the patient. Assessment      1.  PVD (peripheral vascular disease) ([address]          Plan      Recommend he continue aspirin 81 mg, Plavix 75 mg and atorvastatin 20 mg daily  Recommend no smoking  Recommend daily exercise in moderation  We will refer to Dermatologist in [id number] (pustular psoriasis versus dyshidrotic eczema palms and soles)  We will follow-up in 12 months with bilateral A-scan with VADIM's or sooner if claudication worsens, patient develops wound or IRP

## 2022-09-20 DIAGNOSIS — I70.213 ATHEROSCLEROSIS OF NATIVE ARTERY OF BOTH LOWER EXTREMITIES WITH INTERMITTENT CLAUDICATION (HCC): Primary | ICD-10-CM

## 2022-09-21 DIAGNOSIS — L30.1 DYSHIDROTIC ECZEMA: ICD-10-CM

## 2022-09-21 DIAGNOSIS — L40.3 PUSTULAR PSORIASIS OF PALMS AND SOLES: Primary | ICD-10-CM

## 2023-05-03 ENCOUNTER — OFFICE VISIT (OUTPATIENT)
Dept: NEUROLOGY | Age: 73
End: 2023-05-03
Payer: MEDICARE

## 2023-05-03 VITALS
HEART RATE: 68 BPM | WEIGHT: 164 LBS | SYSTOLIC BLOOD PRESSURE: 157 MMHG | OXYGEN SATURATION: 97 % | BODY MASS INDEX: 25.74 KG/M2 | DIASTOLIC BLOOD PRESSURE: 82 MMHG | HEIGHT: 67 IN

## 2023-05-03 DIAGNOSIS — R41.3 MEMORY LOSS: ICD-10-CM

## 2023-05-03 DIAGNOSIS — R51.9 NONINTRACTABLE HEADACHE, UNSPECIFIED CHRONICITY PATTERN, UNSPECIFIED HEADACHE TYPE: ICD-10-CM

## 2023-05-03 DIAGNOSIS — I63.9 CEREBROVASCULAR ACCIDENT (CVA), UNSPECIFIED MECHANISM (HCC): Primary | ICD-10-CM

## 2023-05-03 LAB
CRP SERPL HS-MCNC: <0.3 MG/DL (ref 0–0.5)
ERYTHROCYTE [SEDIMENTATION RATE] IN BLOOD BY WESTERGREN METHOD: 8 MM/HR (ref 0–15)
VIT B12 SERPL-MCNC: 1384 PG/ML (ref 211–946)

## 2023-05-03 PROCEDURE — 1123F ACP DISCUSS/DSCN MKR DOCD: CPT | Performed by: NURSE PRACTITIONER

## 2023-05-03 PROCEDURE — 99214 OFFICE O/P EST MOD 30 MIN: CPT | Performed by: NURSE PRACTITIONER

## 2023-05-03 PROCEDURE — 3077F SYST BP >= 140 MM HG: CPT | Performed by: NURSE PRACTITIONER

## 2023-05-03 PROCEDURE — 3079F DIAST BP 80-89 MM HG: CPT | Performed by: NURSE PRACTITIONER

## 2023-05-03 RX ORDER — LOSARTAN POTASSIUM 50 MG/1
TABLET ORAL
COMMUNITY
Start: 2023-04-10

## 2023-05-03 RX ORDER — FLUTICASONE PROPIONATE 50 MCG
1 SPRAY, SUSPENSION (ML) NASAL DAILY
COMMUNITY

## 2023-05-03 RX ORDER — PANTOPRAZOLE SODIUM 40 MG/1
TABLET, DELAYED RELEASE ORAL
COMMUNITY
Start: 2023-04-10

## 2023-05-03 RX ORDER — LORATADINE 10 MG/1
TABLET ORAL
COMMUNITY

## 2023-05-03 RX ORDER — BENZONATATE 200 MG/1
CAPSULE ORAL
COMMUNITY

## 2023-05-03 RX ORDER — BIOTIN 1 MG
TABLET ORAL
COMMUNITY

## 2023-05-03 NOTE — PROGRESS NOTES
REVIEW OF SYSTEMS    Constitutional: []Fever []Sweat []Chills [] Recent Injury [x] Denies all unless marked  HEENT:[x]Headache  [] Head Injury/Hearing Loss  [] Sore Throat  [] Ear Ache/Dizziness  [x] Denies all unless marked  Spine:  [x] Neck pain  [x] Back pain  [] Sciaticia  [x] Denies all unless marked  Cardiovascular:[]Heart Disease []Chest Pain [] Palpitations  [x] Denies all unless marked  Pulmonary: []Shortness of Breath []Cough   [x] Denies all unless marke  Gastrointestinal: []Nausea  []Vomiting  []Abdominal Pain  []Constipation  []Diarrhea  []Dark Bloody Stools  [x] Denies all unless marked  Psychiatric/Behavioral:[x] Depression [x] Anxiety [x] Denies all unless marked  Genitourinary:   [] Frequency  [] Urgency  [] Incontinence [] Pain with Urination  [x] Denies all unless marked  Extremities: []Pain  []Swelling  [x] Denies all unless marked  Musculoskeletal: [x] Muscle Pain  [x] Joint Pain  [] Arthritis [] Muscle Cramps [] Muscle Twitches  [x] Denies all unless marked  Sleep: [] Insomnia [] Snoring [] Restless Legs [] Sleep Apnea  [] Daytime Sleepiness  [x] Denies all unless marked  Skin:[] Rash [] Skin Discoloration [x] Denies all unless marked   Neurological: []Visual Disturbance/Memory Loss [x] Loss of Balance [] Slurred Speech/Weakness [] Seizures  [x] Vertigo/Dizziness [x] Denies all unless marked
Methylmalonic Acid, Serum     Heavy Metal Blood Panel      3. Nonintractable headache, unspecified chronicity pattern, unspecified headache type  R51.9 MRI BRAIN W WO CONTRAST            PLAN:  Records requested from [facility] to obtain ultrasound carotid arteries. 2. MRI brain W WO to further evaluate abnormalities on CT head, memory loss, headaches. 3.  Based off of MRI brain further testing may be warranted including echocardiogram and Zio patch. 4.  Labs today as listed to further evaluate cognitive deficit. 5. MoCA at next visit to further evaluate memory loss. 6.  Follow-up with vascular as scheduled, may need sooner follow-up based off of ultrasound carotids. 7. Maximize stroke risk factors including blood pressure, cholesterol, glucose. Continue Plavix, baby aspirin. Atorvastatin, losartan. 8. Return in about 2 months (around 7/3/2023) for Follow up, sooner with worsening symptoms. Kelvin Whitney, AVI - CADY     I spent a total of 33 minutes for this encounter including chart review, management, and coordination of patient care. This dictation was generated by voice recognition computer software. Although all attempts were made to edit the dictation for accuracy, there may be errors in the transcription that are not intended. This dictation was generated by voice recognition computer software. Although all attempts are made to edit the dictation for accuracy, there may be errors in the transcription that are not intended.

## 2023-05-04 ENCOUNTER — TELEPHONE (OUTPATIENT)
Dept: NEUROSURGERY | Age: 73
End: 2023-05-04

## 2023-05-04 ENCOUNTER — TELEPHONE (OUTPATIENT)
Dept: NEUROLOGY | Age: 73
End: 2023-05-04

## 2023-05-04 LAB — RPR SER QL: NORMAL

## 2023-05-04 NOTE — TELEPHONE ENCOUNTER
Procedures: MRI BRAIN W WO CONTRAST    Primary Coverage: [facility] MEDICARE  Secondary Coverage Requires Auth:    Source: LEONARDO  Case/Tracking/Auth Ref #    Case Status: APPROVED    Valid Dates if approved:5/4/23-8/1/23    Additional Comments:  FAXED TO [facility]

## 2023-05-05 LAB
B BURGDOR IGG SER QL IB: NEGATIVE
B BURGDOR IGM SER QL IB: NEGATIVE
NUCLEAR IGG SER QL IA: NORMAL

## 2023-05-07 LAB
METHYLMALONATE SERPL-SCNC: 0.15 UMOL/L (ref 0–0.4)
VIT B1 BLD-MCNC: 179 NMOL/L (ref 70–180)

## 2023-05-16 NOTE — PROGRESS NOTES
MRI brain reviewed from [facility]. Cerebral atrophy and chronic small vessel ischemic disease present consistent with age. Old thalamic infarcts. No sign of infarction or mass lesion.

## 2023-07-05 ENCOUNTER — HOSPITAL ENCOUNTER (OUTPATIENT)
Dept: NON INVASIVE DIAGNOSTICS | Age: 73
Discharge: HOME | End: 2023-07-05
Payer: MEDICARE

## 2023-07-05 ENCOUNTER — OFFICE VISIT (OUTPATIENT)
Dept: NEUROLOGY | Age: 73
End: 2023-07-05
Payer: MEDICARE

## 2023-07-05 VITALS
BODY MASS INDEX: 25.74 KG/M2 | HEART RATE: 69 BPM | DIASTOLIC BLOOD PRESSURE: 77 MMHG | HEIGHT: 67 IN | WEIGHT: 164 LBS | SYSTOLIC BLOOD PRESSURE: 147 MMHG | OXYGEN SATURATION: 95 %

## 2023-07-05 DIAGNOSIS — I63.9 CEREBROVASCULAR ACCIDENT (CVA), UNSPECIFIED MECHANISM (HCC): ICD-10-CM

## 2023-07-05 DIAGNOSIS — I63.9 CEREBROVASCULAR ACCIDENT (CVA), UNSPECIFIED MECHANISM (HCC): Primary | ICD-10-CM

## 2023-07-05 DIAGNOSIS — I63.9 CEREBRAL INFARCTION, UNSPECIFIED MECHANISM (HCC): ICD-10-CM

## 2023-07-05 DIAGNOSIS — I65.29 STENOSIS OF CAROTID ARTERY, UNSPECIFIED LATERALITY: ICD-10-CM

## 2023-07-05 DIAGNOSIS — G43.009 MIGRAINE WITHOUT AURA AND WITHOUT STATUS MIGRAINOSUS, NOT INTRACTABLE: ICD-10-CM

## 2023-07-05 DIAGNOSIS — R42 DIZZINESS: ICD-10-CM

## 2023-07-05 PROCEDURE — 3077F SYST BP >= 140 MM HG: CPT | Performed by: NURSE PRACTITIONER

## 2023-07-05 PROCEDURE — 99214 OFFICE O/P EST MOD 30 MIN: CPT | Performed by: NURSE PRACTITIONER

## 2023-07-05 PROCEDURE — 3078F DIAST BP <80 MM HG: CPT | Performed by: NURSE PRACTITIONER

## 2023-07-05 PROCEDURE — 93246 EXT ECG>7D<15D RECORDING: CPT

## 2023-07-05 PROCEDURE — 1123F ACP DISCUSS/DSCN MKR DOCD: CPT | Performed by: NURSE PRACTITIONER

## 2023-07-05 RX ORDER — UBROGEPANT 100 MG/1
TABLET ORAL
Qty: 10 TABLET | Refills: 3 | Status: SHIPPED | OUTPATIENT
Start: 2023-07-05 | End: 2023-07-05 | Stop reason: CLARIF

## 2023-07-05 RX ORDER — RIMEGEPANT SULFATE 75 MG/75MG
TABLET, ORALLY DISINTEGRATING ORAL
Qty: 8 TABLET | Refills: 3 | Status: SHIPPED | OUTPATIENT
Start: 2023-07-05

## 2023-07-05 RX ORDER — ATOGEPANT 60 MG/1
60 TABLET ORAL DAILY
Qty: 30 TABLET | Refills: 3 | Status: SHIPPED | OUTPATIENT
Start: 2023-07-05

## 2023-07-05 NOTE — PROGRESS NOTES
REVIEW OF SYSTEMS    Constitutional: []Fever []Sweat []Chills [] Recent Injury [x] Denies all unless marked  HEENT:[x]Headache  [] Head Injury/Hearing Loss  [] Sore Throat  [] Ear Ache/Dizziness  [x] Denies all unless marked  Spine:  [x] Neck pain  [x] Back pain  [] Sciaticia  [x] Denies all unless marked  Cardiovascular:[]Heart Disease []Chest Pain [] Palpitations  [x] Denies all unless marked  Pulmonary: []Shortness of Breath []Cough   [x] Denies all unless marke  Gastrointestinal: []Nausea  [x]Vomiting  []Abdominal Pain  []Constipation  []Diarrhea  []Dark Bloody Stools  [x] Denies all unless marked  Psychiatric/Behavioral:[] Depression [] Anxiety [x] Denies all unless marked  Genitourinary:   [] Frequency  [] Urgency  [] Incontinence [] Pain with Urination  [x] Denies all unless marked  Extremities: []Pain  []Swelling  [x] Denies all unless marked  Musculoskeletal: [x] Muscle Pain  [] Joint Pain  [] Arthritis [] Muscle Cramps [] Muscle Twitches  [x] Denies all unless marked  Sleep: [] Insomnia [] Snoring [] Restless Legs [] Sleep Apnea  [] Daytime Sleepiness  [x] Denies all unless marked  Skin:[] Rash [] Skin Discoloration [x] Denies all unless marked   Neurological: []Visual Disturbance/Memory Loss [x] Loss of Balance [] Slurred Speech/Weakness [] Seizures  [x] Vertigo/Dizziness [x] Denies all unless marked
This dictation was generated by voice recognition computer software. Although all attempts are made to edit the dictation for accuracy, there may be errors in the transcription that are not intended.

## 2023-07-05 NOTE — TELEPHONE ENCOUNTER
Spoke with patient regarding medication. I advised that Angel Rockwell was sent into pharmacy in error. That the medication that patient would picking up would be the Nurtec and Phoenix Huger as discussed in office. I advised that the Phoenix Huger is a once a day daily drug and that the nurtec is as needed that he would take one tablet at the onset of headache and to not take more then 1 tablet in 24 hours. He voiced his understanding and had no questions at this time.

## 2023-07-06 ENCOUNTER — CLINICAL DOCUMENTATION (OUTPATIENT)
Dept: NEUROLOGY | Age: 73
End: 2023-07-06

## 2023-07-06 NOTE — PROGRESS NOTES
Procedures: MRA HEAD    Primary Coverage: [facility] MEDICARE  Secondary Coverage Requires Auth:    Source: Lily Trujillo  Case/Tracking/Auth Ref # [id number]    Case Status: APPROVED    Valid Dates if approved: 07/06/2023-10/03/2023    Additional Comments: FAXED TO [facility]

## 2023-07-11 ENCOUNTER — CLINICAL DOCUMENTATION (OUTPATIENT)
Dept: NEUROLOGY | Age: 73
End: 2023-07-11

## 2023-07-11 NOTE — PROGRESS NOTES
ALLAN RUBIN Case ID: [id number] - Rx #: [id number] help?  Call us at [phone]  Outcome  Approvedtoday  [place] Case: [id number], Status: Approved, Coverage Starts on: 4/11/2023 12:00:00 AM, Coverage Ends on: 7/10/2024 12:00:00 AM.  Drug  Ubrelvy 100MG tablets  Form  [employer] Electronic [place] Form ([facility])  [address]  [phone]

## 2023-07-12 ENCOUNTER — CLINICAL DOCUMENTATION (OUTPATIENT)
Dept: NEUROLOGY | Age: 73
End: 2023-07-12

## 2023-07-12 NOTE — PROGRESS NOTES
Dia Charters KeyJena Hamman - PA Case ID: [id number] - Rx #: [id number] help?  Call us at [phone]  Outcome  Approvedtoday  PA Case: [id number], Status: Approved, Coverage Starts on: 4/12/2023 12:00:00 AM, Coverage Ends on: 7/11/2024 12:00:00 AM.  Drug  Qulipta 60MG tablets  Form  [employer] Electronic PA Form ([address] [facility])  [address] [facility]

## 2023-07-14 ENCOUNTER — TELEPHONE (OUTPATIENT)
Dept: NEUROLOGY | Age: 73
End: 2023-07-14

## 2023-07-14 NOTE — TELEPHONE ENCOUNTER
Radiologist called stating patient was at the facility to get MRA test, patient currently has a Zio patch on that he is not able to wear during test. Spoke with AVI Colon and recommendation of rescheduling MRA until Zio patch course is completed

## 2023-08-04 ENCOUNTER — TELEPHONE (OUTPATIENT)
Dept: NEUROLOGY | Age: 73
End: 2023-08-04

## 2023-08-04 NOTE — TELEPHONE ENCOUNTER
Called and spoke with patient. I advised them of  result note seen below. He voiced his understanding. Patient voiced that he does not have a cardiologist. I advised that I would let  know this.    ----- Message from Isela Ingram, [address] [place] [address] - CNP sent at 8/1/2023 10:19 AM CDT -----  Please let him know he had some abnormalities on his Zio patch, does he have a cardiologist in [address] [facility]?

## 2023-09-24 NOTE — PROGRESS NOTES
Lungs - Breath sounds normal. No wheezes or rales. Extremities -  - Radial and brachial pulses are 2+ to palpation bilaterally. Right femoral pulse: present 2+; Right popliteal pulse: absent Right DP: absent; Right PT absent; Left femoral pulse: present 2+; Left popliteal pulse: absent; Left DP: absent; Left PT: absent No cyanosis, clubbing, or significant edema. No signs atheroembolic event. Neurologic - alert and oriented X 3. Physiologic. Face symmetric. Skin - warm, dry, and intact. no wound  Psychiatric - mood, affect, and behavior appear normal.  Judgment and thought processes appear normal.    Risk factors for atherosclerosis of all vascular beds have been reviewed with the patient including:  Family history, tobacco abuse in all forms, elevated cholesterol, hyperlipidemia, and diabetes. Lower extremity arterial study: Right VADIM 1.04, Left VADIM 1.08. Ascan - some stenosis right sfa stent  Individual films reviewed: Yes. These results were reviewed with the patient. Disease process is chronic illness with exacerbation, progression, or side effects of treatment  by review of waveforms, I see mild progression of disease        Reviewed previous studies including: ascan   Individual images were reviewed. I agree with the findings  Results were discussed with the patient. ASSESSMENT/PLAN:  1. Atherosclerosis of native arteries of extremities with intermittent claudication, bilateral legs ([address])  2. Bilateral carotid artery stenosis  -     VL DUP CAROTID BILATERAL; Future    1. Atherosclerosis of native arteries of extremities with intermittent claudication, bilateral legs ([address])    2.  Bilateral carotid artery stenosis     chronic illness with exacerbation, progression, or side effects of treatment    Discussed management of carotid u/s and ascan  which includes:  continue asa, plavix, and lipitor to reduce risk of TIA/CVA, to reduce risk of arterial thrombosis, and to decrease rate of

## 2023-09-25 ENCOUNTER — HOSPITAL ENCOUNTER (OUTPATIENT)
Dept: VASCULAR LAB | Age: 73
Discharge: HOME | End: 2023-09-25
Payer: MEDICARE

## 2023-09-25 ENCOUNTER — OFFICE VISIT (OUTPATIENT)
Dept: VASCULAR SURGERY | Age: 73
End: 2023-09-25
Payer: MEDICARE

## 2023-09-25 VITALS
DIASTOLIC BLOOD PRESSURE: 70 MMHG | HEART RATE: 76 BPM | OXYGEN SATURATION: 97 % | WEIGHT: 164 LBS | TEMPERATURE: 96.8 F | BODY MASS INDEX: 25.74 KG/M2 | HEIGHT: 67 IN | SYSTOLIC BLOOD PRESSURE: 132 MMHG

## 2023-09-25 DIAGNOSIS — I70.213 ATHEROSCLEROSIS OF NATIVE ARTERY OF BOTH LOWER EXTREMITIES WITH INTERMITTENT CLAUDICATION (HCC): ICD-10-CM

## 2023-09-25 DIAGNOSIS — I65.23 BILATERAL CAROTID ARTERY STENOSIS: ICD-10-CM

## 2023-09-25 DIAGNOSIS — I70.213 ATHEROSCLEROSIS OF NATIVE ARTERIES OF EXTREMITIES WITH INTERMITTENT CLAUDICATION, BILATERAL LEGS (HCC): Primary | ICD-10-CM

## 2023-09-25 PROCEDURE — 1123F ACP DISCUSS/DSCN MKR DOCD: CPT | Performed by: NURSE PRACTITIONER

## 2023-09-25 PROCEDURE — 99214 OFFICE O/P EST MOD 30 MIN: CPT | Performed by: NURSE PRACTITIONER

## 2023-09-25 PROCEDURE — 93925 LOWER EXTREMITY STUDY: CPT

## 2023-09-25 PROCEDURE — 93922 UPR/L XTREMITY ART 2 LEVELS: CPT

## 2023-09-25 PROCEDURE — 3074F SYST BP LT 130 MM HG: CPT | Performed by: NURSE PRACTITIONER

## 2023-09-25 PROCEDURE — 3078F DIAST BP <80 MM HG: CPT | Performed by: NURSE PRACTITIONER

## 2023-10-10 ENCOUNTER — OFFICE VISIT (OUTPATIENT)
Dept: NEUROLOGY | Age: 73
End: 2023-10-10
Payer: MEDICARE

## 2023-10-10 VITALS
WEIGHT: 164 LBS | DIASTOLIC BLOOD PRESSURE: 69 MMHG | OXYGEN SATURATION: 99 % | HEART RATE: 87 BPM | HEIGHT: 67 IN | SYSTOLIC BLOOD PRESSURE: 112 MMHG | BODY MASS INDEX: 25.74 KG/M2

## 2023-10-10 DIAGNOSIS — R42 DIZZINESS: ICD-10-CM

## 2023-10-10 DIAGNOSIS — I67.89 OTHER CEREBROVASCULAR DISEASE: ICD-10-CM

## 2023-10-10 DIAGNOSIS — I69.30 HISTORY OF CVA WITH RESIDUAL DEFICIT: Primary | ICD-10-CM

## 2023-10-10 DIAGNOSIS — R41.3 MEMORY LOSS: ICD-10-CM

## 2023-10-10 DIAGNOSIS — Z79.899 MEDICATION MANAGEMENT: ICD-10-CM

## 2023-10-10 LAB
ALBUMIN SERPL-MCNC: 4.4 G/DL (ref 3.5–5.2)
ALP SERPL-CCNC: 148 U/L (ref 40–130)
ALT SERPL-CCNC: 14 U/L (ref 5–41)
ANION GAP SERPL CALCULATED.3IONS-SCNC: 14 MMOL/L (ref 7–19)
AST SERPL-CCNC: 14 U/L (ref 5–40)
BASOPHILS # BLD: 0.1 K/UL (ref 0–0.2)
BASOPHILS NFR BLD: 0.8 % (ref 0–1)
BILIRUB SERPL-MCNC: 0.4 MG/DL (ref 0.2–1.2)
BUN SERPL-MCNC: 20 MG/DL (ref 8–23)
CALCIUM SERPL-MCNC: 9.8 MG/DL (ref 8.8–10.2)
CHLORIDE SERPL-SCNC: 99 MMOL/L (ref 98–111)
CO2 SERPL-SCNC: 26 MMOL/L (ref 22–29)
CREAT SERPL-MCNC: 1.9 MG/DL (ref 0.5–1.2)
CRP SERPL HS-MCNC: <0.3 MG/DL (ref 0–0.5)
EOSINOPHIL # BLD: 0.1 K/UL (ref 0–0.6)
EOSINOPHIL NFR BLD: 1.3 % (ref 0–5)
ERYTHROCYTE [DISTWIDTH] IN BLOOD BY AUTOMATED COUNT: 12.7 % (ref 11.5–14.5)
ERYTHROCYTE [SEDIMENTATION RATE] IN BLOOD BY WESTERGREN METHOD: 6 MM/HR (ref 0–15)
GLUCOSE SERPL-MCNC: 92 MG/DL (ref 74–109)
HCT VFR BLD AUTO: 44.9 % (ref 42–52)
HGB BLD-MCNC: 15 G/DL (ref 14–18)
IMM GRANULOCYTES # BLD: 0.1 K/UL
LYMPHOCYTES # BLD: 2.8 K/UL (ref 1.1–4.5)
LYMPHOCYTES NFR BLD: 27.3 % (ref 20–40)
MCH RBC QN AUTO: 31.1 PG (ref 27–31)
MCHC RBC AUTO-ENTMCNC: 33.4 G/DL (ref 33–37)
MCV RBC AUTO: 93.2 FL (ref 80–94)
MONOCYTES # BLD: 0.9 K/UL (ref 0–0.9)
MONOCYTES NFR BLD: 8.6 % (ref 0–10)
NEUTROPHILS # BLD: 6.4 K/UL (ref 1.5–7.5)
NEUTS SEG NFR BLD: 61.3 % (ref 50–65)
PLATELET # BLD AUTO: 291 K/UL (ref 130–400)
PMV BLD AUTO: 10.4 FL (ref 9.4–12.4)
POTASSIUM SERPL-SCNC: 3.9 MMOL/L (ref 3.5–5)
PROT SERPL-MCNC: 7.6 G/DL (ref 6.6–8.7)
RBC # BLD AUTO: 4.82 M/UL (ref 4.7–6.1)
SODIUM SERPL-SCNC: 139 MMOL/L (ref 136–145)
TSH SERPL DL<=0.005 MIU/L-ACNC: 3.35 UIU/ML (ref 0.35–5.5)
VIT B12 SERPL-MCNC: 509 PG/ML (ref 211–946)
WBC # BLD AUTO: 10.4 K/UL (ref 4.8–10.8)

## 2023-10-10 PROCEDURE — 99214 OFFICE O/P EST MOD 30 MIN: CPT | Performed by: NURSE PRACTITIONER

## 2023-10-10 PROCEDURE — 1123F ACP DISCUSS/DSCN MKR DOCD: CPT | Performed by: NURSE PRACTITIONER

## 2023-10-10 PROCEDURE — 3078F DIAST BP <80 MM HG: CPT | Performed by: NURSE PRACTITIONER

## 2023-10-10 PROCEDURE — 3074F SYST BP LT 130 MM HG: CPT | Performed by: NURSE PRACTITIONER

## 2023-10-10 RX ORDER — MEMANTINE HYDROCHLORIDE 5 MG/1
5 TABLET ORAL 2 TIMES DAILY
Qty: 60 TABLET | Refills: 3 | Status: SHIPPED | OUTPATIENT
Start: 2023-10-10

## 2023-10-10 RX ORDER — LOSARTAN POTASSIUM AND HYDROCHLOROTHIAZIDE 25; 100 MG/1; MG/1
TABLET ORAL
COMMUNITY
Start: 2023-09-18

## 2023-10-10 RX ORDER — ATORVASTATIN CALCIUM 40 MG/1
TABLET, FILM COATED ORAL
COMMUNITY
Start: 2023-10-03

## 2023-10-10 NOTE — PROGRESS NOTES
REVIEW OF SYSTEMS    Constitutional: []Fever []Sweat []Chills [x] Recent Injury [x] Denies all unless marked  HEENT:[x]Headache  [] Head Injury/Hearing Loss  [] Sore Throat  [] Ear Ache/Dizziness  [x] Denies all unless marked  Spine:  [x] Neck pain  [] Back pain  [] Sciaticia  [x] Denies all unless marked  Cardiovascular:[]Heart Disease []Chest Pain [] Palpitations  [x] Denies all unless marked  Pulmonary: []Shortness of Breath []Cough   [x] Denies all unless marke  Gastrointestinal: []Nausea  []Vomiting  []Abdominal Pain  []Constipation  []Diarrhea  []Dark Bloody Stools  [x] Denies all unless marked  Psychiatric/Behavioral:[] Depression [] Anxiety [x] Denies all unless marked  Genitourinary:   [] Frequency  [] Urgency  [] Incontinence [] Pain with Urination  [x] Denies all unless marked  Extremities: []Pain  []Swelling  [x] Denies all unless marked  Musculoskeletal: [] Muscle Pain  [] Joint Pain  [] Arthritis [] Muscle Cramps [] Muscle Twitches  [x] Denies all unless marked  Sleep: [] Insomnia [] Snoring [] Restless Legs [] Sleep Apnea  [] Daytime Sleepiness  [x] Denies all unless marked  Skin:[] Rash [] Skin Discoloration [x] Denies all unless marked   Neurological: [x]Visual Disturbance/Memory Loss [x] Loss of Balance [] Slurred Speech/Weakness [] Seizures  [x] Vertigo/Dizziness [x] Denies all unless marked
on this date of service. This time includes time spent by me in the following activities: preparing for the visit, reviewing tests, performing a medically appropriate examination and/or evaluation , counseling and educating the patient/family/caregiver, ordering medications, tests, or procedures, documenting information in the medical record and care coordination. This dictation was generated by voice recognition computer software. Although all attempts are made to edit the dictation for accuracy, there may be errors in the transcription that are not intended.

## 2023-10-12 LAB
B BURGDOR IGG SER QL IB: NEGATIVE
B BURGDOR IGM SER QL IB: NEGATIVE
NUCLEAR IGG SER QL IA: NORMAL
RPR SER QL: NORMAL

## 2023-10-13 ENCOUNTER — HOSPITAL ENCOUNTER (OUTPATIENT)
Dept: NON INVASIVE DIAGNOSTICS | Age: 73
Discharge: HOME | End: 2023-10-13
Payer: MEDICARE

## 2023-10-13 DIAGNOSIS — I69.30 HISTORY OF CVA WITH RESIDUAL DEFICIT: ICD-10-CM

## 2023-10-13 DIAGNOSIS — I67.89 OTHER CEREBROVASCULAR DISEASE: ICD-10-CM

## 2023-10-13 LAB — VIT B1 BLD-MCNC: 137 NMOL/L (ref 70–180)

## 2023-10-13 PROCEDURE — 93306 TTE W/DOPPLER COMPLETE: CPT

## 2023-10-17 DIAGNOSIS — G43.009 MIGRAINE WITHOUT AURA AND WITHOUT STATUS MIGRAINOSUS, NOT INTRACTABLE: ICD-10-CM

## 2023-10-17 RX ORDER — ATOGEPANT 60 MG/1
1 TABLET ORAL DAILY
Qty: 30 TABLET | Refills: 0 | Status: SHIPPED | OUTPATIENT
Start: 2023-10-17

## 2023-10-17 NOTE — TELEPHONE ENCOUNTER
Requested Prescriptions     Pending Prescriptions Disp Refills    QULIPTA 60 MG TABS [Pharmacy Med Name: Munir Thrasher 60 MG TABLET] 30 tablet 0     Sig: TAKE ONE TABLET BY MOUTH DAILY       Last Office Visit: 10/10/2023  Next Office Visit: 1/10/2024  Last Medication Refill: 7/5/2023 with 3 RF

## 2023-10-31 DIAGNOSIS — R93.1 ABNORMAL ECHOCARDIOGRAM: Primary | ICD-10-CM

## 2023-11-06 ENCOUNTER — OFFICE VISIT (OUTPATIENT)
Dept: CARDIOLOGY CLINIC | Age: 73
End: 2023-11-06

## 2023-11-06 VITALS
WEIGHT: 161 LBS | HEIGHT: 67 IN | SYSTOLIC BLOOD PRESSURE: 106 MMHG | DIASTOLIC BLOOD PRESSURE: 58 MMHG | HEART RATE: 78 BPM | BODY MASS INDEX: 25.27 KG/M2

## 2023-11-06 DIAGNOSIS — I20.9 ANGINA PECTORIS (HCC): ICD-10-CM

## 2023-11-06 DIAGNOSIS — Z86.73 HISTORY OF CVA (CEREBROVASCULAR ACCIDENT): Primary | ICD-10-CM

## 2023-11-06 RX ORDER — NICOTINE 21 MG/24HR
1 PATCH, TRANSDERMAL 24 HOURS TRANSDERMAL DAILY
Qty: 42 PATCH | Refills: 0 | Status: SHIPPED | OUTPATIENT
Start: 2023-11-06 | End: 2023-12-18

## 2023-11-06 RX ORDER — CLONIDINE HYDROCHLORIDE 0.1 MG/1
0.1 TABLET ORAL PRN
COMMUNITY
End: 2023-11-06 | Stop reason: CLARIF

## 2023-11-06 RX ORDER — NICOTINE 21 MG/24HR
1 PATCH, TRANSDERMAL 24 HOURS TRANSDERMAL DAILY
Qty: 14 PATCH | Refills: 1 | Status: SHIPPED | OUTPATIENT
Start: 2023-12-19 | End: 2024-01-02

## 2023-11-06 NOTE — PROGRESS NOTES
regurgitation  Continue conservative medical management  Repeat 2D echocardiogram in 2 years    Peripheral vascular disease  Continue dual antiplatelets and statin therapy    Cigarette smoking  Cigarette smoking cessation counseling offered  Agreeable to start nicotine patch, prescribed              Electronically signed by Mansi Mcnulty MD on 11/6/2023 at 8:15 AM    Mansi Mcnulty MD, MARY, [facility]  Noninvasive Cardiology Consultant    This dictation was generated by voice recognition computer software. Although all attempts are made to edit the dictation for accuracy, there may be errors in the transcription that are not intended.

## 2023-11-07 RX ORDER — LOSARTAN POTASSIUM 25 MG/1
25 TABLET ORAL DAILY
COMMUNITY

## 2023-11-07 RX ORDER — SELENIUM 50 MCG
1 TABLET ORAL DAILY
COMMUNITY

## 2023-11-07 RX ORDER — CLINDAMYCIN HYDROCHLORIDE 300 MG/1
600 CAPSULE ORAL 3 TIMES DAILY
COMMUNITY

## 2023-11-14 ENCOUNTER — HOSPITAL ENCOUNTER (OUTPATIENT)
Dept: NUCLEAR MEDICINE | Age: 73
Discharge: HOME | End: 2023-11-16
Attending: INTERNAL MEDICINE
Payer: MEDICARE

## 2023-11-14 DIAGNOSIS — I20.9 ANGINA PECTORIS (HCC): ICD-10-CM

## 2023-11-14 PROCEDURE — 6360000002 HC RX W HCPCS: Performed by: INTERNAL MEDICINE

## 2023-11-14 PROCEDURE — 3430000000 HC RX DIAGNOSTIC RADIOPHARMACEUTICAL: Performed by: INTERNAL MEDICINE

## 2023-11-14 PROCEDURE — A9502 TC99M TETROFOSMIN: HCPCS | Performed by: INTERNAL MEDICINE

## 2023-11-14 PROCEDURE — 93017 CV STRESS TEST TRACING ONLY: CPT

## 2023-11-14 RX ORDER — REGADENOSON 0.08 MG/ML
0.4 INJECTION, SOLUTION INTRAVENOUS
Status: COMPLETED | OUTPATIENT
Start: 2023-11-14 | End: 2023-11-14

## 2023-11-14 RX ADMIN — REGADENOSON 0.4 MG: 0.08 INJECTION, SOLUTION INTRAVENOUS at 10:26

## 2023-11-14 RX ADMIN — TETROFOSMIN 24 MILLICURIE: 1.38 INJECTION, POWDER, LYOPHILIZED, FOR SOLUTION INTRAVENOUS at 10:35

## 2023-11-14 RX ADMIN — TETROFOSMIN 8 MILLICURIE: 1.38 INJECTION, POWDER, LYOPHILIZED, FOR SOLUTION INTRAVENOUS at 09:10

## 2023-12-05 ENCOUNTER — HOSPITAL ENCOUNTER (OUTPATIENT)
Dept: VASCULAR LAB | Age: 73
Discharge: HOME | End: 2023-12-05
Payer: MEDICARE

## 2023-12-05 ENCOUNTER — OFFICE VISIT (OUTPATIENT)
Dept: VASCULAR SURGERY | Age: 73
End: 2023-12-05
Payer: MEDICARE

## 2023-12-05 VITALS
DIASTOLIC BLOOD PRESSURE: 68 MMHG | SYSTOLIC BLOOD PRESSURE: 110 MMHG | OXYGEN SATURATION: 97 % | HEART RATE: 67 BPM | TEMPERATURE: 97 F

## 2023-12-05 DIAGNOSIS — I65.23 BILATERAL CAROTID ARTERY STENOSIS: ICD-10-CM

## 2023-12-05 DIAGNOSIS — I70.213 ATHEROSCLER OF NATIVE ARTERY OF BOTH LEGS WITH INTERMIT CLAUDICATION (HCC): Primary | ICD-10-CM

## 2023-12-05 PROCEDURE — 99214 OFFICE O/P EST MOD 30 MIN: CPT | Performed by: NURSE PRACTITIONER

## 2023-12-05 PROCEDURE — 3074F SYST BP LT 130 MM HG: CPT | Performed by: NURSE PRACTITIONER

## 2023-12-05 PROCEDURE — 1123F ACP DISCUSS/DSCN MKR DOCD: CPT | Performed by: NURSE PRACTITIONER

## 2023-12-05 PROCEDURE — 3078F DIAST BP <80 MM HG: CPT | Performed by: NURSE PRACTITIONER

## 2023-12-05 PROCEDURE — 93880 EXTRACRANIAL BILAT STUDY: CPT

## 2023-12-05 NOTE — PROGRESS NOTES
Elaine Naranjo (:  1950) is a 72 y.o. male,Established patient, here for evaluation of the following chief complaint(s):  Follow-up (Follow up carotid. )            SUBJECTIVE/OBJECTIVE:  He presents for follow up of carotid artery stenosis. He has a known history of carotid artery stenosis for 1 - 5 years. His current treatment includes clopidogrel 75 mg po qd, ASA EC daily. He denies a history of CVA. He reports has not had TIA's, episodes of lateralizing weakness and episodes of amaurosis fugax.     I have personally reviewed the following: problem list, current meds, allergies, PMH, PSH, family hx, and social hx  Elaine Naranjo is a 72 y.o. 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, left leg ([address]) 05/15/2020    PVD (peripheral vascular disease) ([address]) 2019    Atherosclerosis with claudication of extremity ([address]) 2018    Atherosclerosis of native artery of both lower extremities with intermittent claudication ([address]) 2016    Hyperlipidemia     Hypertension      Current Outpatient Medications   Medication Sig Dispense Refill    losartan (COZAAR) 25 MG tablet Take 1 tablet by mouth daily Take tablet if BP is over 145 consistently      Lactobacillus (ACIDOPHILUS) CAPS capsule Take 1 capsule by mouth daily      clindamycin (CLEOCIN) 300 MG capsule Take 2 capsules by mouth 3 times daily      nicotine (NICODERM CQ) 21 MG/24HR Place 1 patch onto the skin daily 42 patch 0    [START ON 2023] nicotine (NICODERM CQ) 14 MG/24HR Place 1 patch onto the skin daily for 14 days 14 patch 1    QULIPTA 60 MG TABS TAKE ONE TABLET BY MOUTH DAILY 30 tablet 0    acetaminophen (TYLENOL) 500 MG CAPS capsule 1 capsule as needed Orally every 6 hrs      atorvastatin (LIPITOR) 40 MG tablet       memantine

## 2023-12-11 DIAGNOSIS — G43.009 MIGRAINE WITHOUT AURA AND WITHOUT STATUS MIGRAINOSUS, NOT INTRACTABLE: ICD-10-CM

## 2023-12-12 RX ORDER — ATOGEPANT 60 MG/1
1 TABLET ORAL DAILY
Qty: 30 TABLET | Refills: 0 | Status: SHIPPED | OUTPATIENT
Start: 2023-12-12 | End: 2024-01-05

## 2023-12-12 NOTE — TELEPHONE ENCOUNTER
Requested Prescriptions     Pending Prescriptions Disp Refills    QULIPTA 60 MG TABS [Pharmacy Med Name: QULIPTA 60 MG TABLET] 30 tablet 0     Sig: TAKE ONE TABLET BY MOUTH DAILY       Last Office Visit: 10/10/2023  Next Office Visit: 1/10/2024  Last Medication Refill: 10/17/2023 with 0 RF

## 2024-01-03 DIAGNOSIS — G43.009 MIGRAINE WITHOUT AURA AND WITHOUT STATUS MIGRAINOSUS, NOT INTRACTABLE: ICD-10-CM

## 2024-01-03 DIAGNOSIS — R41.3 MEMORY LOSS: ICD-10-CM

## 2024-01-03 RX ORDER — MEMANTINE HYDROCHLORIDE 5 MG/1
5 TABLET ORAL 2 TIMES DAILY
Qty: 60 TABLET | Refills: 0 | Status: SHIPPED | OUTPATIENT
Start: 2024-01-03

## 2024-01-03 NOTE — TELEPHONE ENCOUNTER
Requested Prescriptions     Pending Prescriptions Disp Refills    QULIPTA 60 MG TABS [Pharmacy Med Name: QULIPTA 60 MG TABLET] 30 tablet 0     Sig: TAKE ONE TABLET BY MOUTH DAILY       Last Office Visit: 10/10/2023  Next Office Visit: 1/10/2024  Last Medication Refill:  12/12/23

## 2024-01-03 NOTE — TELEPHONE ENCOUNTER
Requested Prescriptions     Pending Prescriptions Disp Refills    memantine (NAMENDA) 5 MG tablet [Pharmacy Med Name: MEMANTINE HCL 5 MG TABLET] 60 tablet 0     Sig: TAKE 1 TABLET BY MOUTH TWICE DAILY.       Last Office Visit: 10/10/2023  Next Office Visit: 1/10/2024  Last Medication Refill:  10/10/23 with 3 RF

## 2024-01-05 RX ORDER — ATOGEPANT 60 MG/1
1 TABLET ORAL DAILY
Qty: 30 TABLET | Refills: 0 | Status: SHIPPED | OUTPATIENT
Start: 2024-01-05

## 2024-01-10 ENCOUNTER — OFFICE VISIT (OUTPATIENT)
Dept: NEUROLOGY | Age: 74
End: 2024-01-10
Payer: MEDICARE

## 2024-01-10 ENCOUNTER — TELEPHONE (OUTPATIENT)
Dept: NEUROLOGY | Age: 74
End: 2024-01-10

## 2024-01-10 ENCOUNTER — HOSPITAL ENCOUNTER (OUTPATIENT)
Dept: CT IMAGING | Age: 74
Discharge: HOME | End: 2024-01-10
Payer: MEDICARE

## 2024-01-10 VITALS
DIASTOLIC BLOOD PRESSURE: 67 MMHG | OXYGEN SATURATION: 97 % | HEIGHT: 67 IN | BODY MASS INDEX: 25.27 KG/M2 | WEIGHT: 161 LBS | HEART RATE: 80 BPM | SYSTOLIC BLOOD PRESSURE: 120 MMHG

## 2024-01-10 DIAGNOSIS — R55 SYNCOPE AND COLLAPSE: ICD-10-CM

## 2024-01-10 DIAGNOSIS — R20.2 RIGHT HAND PARESTHESIA: ICD-10-CM

## 2024-01-10 DIAGNOSIS — R42 DIZZINESS: ICD-10-CM

## 2024-01-10 DIAGNOSIS — I63.9 CEREBROVASCULAR ACCIDENT (CVA), UNSPECIFIED MECHANISM (HCC): ICD-10-CM
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
## 2024-01-10 DIAGNOSIS — M54.2 NECK PAIN: ICD-10-CM

## 2024-01-10 DIAGNOSIS — F03.90 DEMENTIA, UNSPECIFIED DEMENTIA SEVERITY, UNSPECIFIED DEMENTIA TYPE, UNSPECIFIED WHETHER BEHAVIORAL, PSYCHOTIC, OR MOOD DISTURBANCE OR ANXIETY (HCC): ICD-10-CM

## 2024-01-10 DIAGNOSIS — R55 SYNCOPE AND COLLAPSE: Primary | ICD-10-CM

## 2024-01-10 DIAGNOSIS — G44.86 CERVICOGENIC HEADACHE: ICD-10-CM

## 2024-01-10 LAB
ALBUMIN SERPL-MCNC: 4 G/DL (ref 3.5–5.2)
ALP SERPL-CCNC: 117 U/L (ref 40–130)
ALT SERPL-CCNC: 13 U/L (ref 5–41)
ANION GAP SERPL CALCULATED.3IONS-SCNC: 10 MMOL/L (ref 7–19)
AST SERPL-CCNC: 18 U/L (ref 5–40)
BASOPHILS # BLD: 0.1 K/UL (ref 0–0.2)
BASOPHILS NFR BLD: 0.8 % (ref 0–1)
BILIRUB SERPL-MCNC: 0.3 MG/DL (ref 0.2–1.2)
BUN SERPL-MCNC: 27 MG/DL (ref 8–23)
CALCIUM SERPL-MCNC: 9.8 MG/DL (ref 8.8–10.2)
CHLORIDE SERPL-SCNC: 102 MMOL/L (ref 98–111)
CO2 SERPL-SCNC: 27 MMOL/L (ref 22–29)
CREAT SERPL-MCNC: 1.8 MG/DL (ref 0.5–1.2)
EOSINOPHIL # BLD: 0.2 K/UL (ref 0–0.6)
EOSINOPHIL NFR BLD: 2.9 % (ref 0–5)
ERYTHROCYTE [DISTWIDTH] IN BLOOD BY AUTOMATED COUNT: 13.4 % (ref 11.5–14.5)
GLUCOSE SERPL-MCNC: 137 MG/DL (ref 74–109)
HCT VFR BLD AUTO: 43.7 % (ref 42–52)
HGB BLD-MCNC: 14.8 G/DL (ref 14–18)
IMM GRANULOCYTES # BLD: 0.1 K/UL
LYMPHOCYTES # BLD: 2.7 K/UL (ref 1.1–4.5)
LYMPHOCYTES NFR BLD: 33.9 % (ref 20–40)
MCH RBC QN AUTO: 31.7 PG (ref 27–31)
MCHC RBC AUTO-ENTMCNC: 33.9 G/DL (ref 33–37)
MCV RBC AUTO: 93.6 FL (ref 80–94)
MONOCYTES # BLD: 0.7 K/UL (ref 0–0.9)
MONOCYTES NFR BLD: 8.9 % (ref 0–10)
NEUTROPHILS # BLD: 4.2 K/UL (ref 1.5–7.5)
NEUTS SEG NFR BLD: 52.9 % (ref 50–65)
PLATELET # BLD AUTO: 260 K/UL (ref 130–400)
PMV BLD AUTO: 9.9 FL (ref 9.4–12.4)
POTASSIUM SERPL-SCNC: 3.1 MMOL/L (ref 3.5–5)
PROT SERPL-MCNC: 7.3 G/DL (ref 6.6–8.7)
RBC # BLD AUTO: 4.67 M/UL (ref 4.7–6.1)
SODIUM SERPL-SCNC: 139 MMOL/L (ref 136–145)
WBC # BLD AUTO: 8 K/UL (ref 4.8–10.8)

## 2024-01-10 PROCEDURE — 1123F ACP DISCUSS/DSCN MKR DOCD: CPT | Performed by: NURSE PRACTITIONER

## 2024-01-10 PROCEDURE — 3074F SYST BP LT 130 MM HG: CPT | Performed by: NURSE PRACTITIONER

## 2024-01-10 PROCEDURE — 70450 CT HEAD/BRAIN W/O DYE: CPT

## 2024-01-10 PROCEDURE — 3078F DIAST BP <80 MM HG: CPT | Performed by: NURSE PRACTITIONER

## 2024-01-10 PROCEDURE — 99214 OFFICE O/P EST MOD 30 MIN: CPT | Performed by: NURSE PRACTITIONER

## 2024-01-10 RX ORDER — LOSARTAN POTASSIUM AND HYDROCHLOROTHIAZIDE 25; 100 MG/1; MG/1
TABLET ORAL
COMMUNITY
Start: 2023-12-21

## 2024-01-10 NOTE — PROGRESS NOTES
REVIEW OF SYSTEMS    Constitutional: []Fever []Sweat []Chills [] Recent Injury [x] Denies all unless marked  HEENT:[x]Headache  [] Head Injury/Hearing Loss  [] Sore Throat  [] Ear Ache/Dizziness  [x] Denies all unless marked  Spine:  [x] Neck pain  [] Back pain  [] Sciaticia  [x] Denies all unless marked  Cardiovascular:[]Heart Disease []Chest Pain [] Palpitations  [x] Denies all unless marked  Pulmonary: []Shortness of Breath []Cough   [x] Denies all unless marke  Gastrointestinal: []Nausea  []Vomiting  []Abdominal Pain  []Constipation  []Diarrhea  []Dark Bloody Stools  [x] Denies all unless marked  Psychiatric/Behavioral:[] Depression [x] Anxiety [x] Denies all unless marked  Genitourinary:   [] Frequency  [] Urgency  [] Incontinence [] Pain with Urination  [x] Denies all unless marked  Extremities: [x]Pain  []Swelling  [x] Denies all unless marked  Musculoskeletal: [x] Muscle Pain  [] Joint Pain  [] Arthritis [] Muscle Cramps [] Muscle Twitches  [x] Denies all unless marked  Sleep: [] Insomnia [] Snoring [] Restless Legs [] Sleep Apnea  [] Daytime Sleepiness  [x] Denies all unless marked  Skin:[] Rash [] Skin Discoloration [x] Denies all unless marked   Neurological: [x]Visual Disturbance/Memory Loss [x] Loss of Balance [] Slurred Speech/Weakness [] Seizures  [x] Vertigo/Dizziness [x] Denies all unless marked       
disease (HCC)     Tobacco abuse     Vascular disease, peripheral (HCC)     Vitamin D deficiency        Surgical History:      Procedure Laterality Date    COLONOSCOPY      ENDOSCOPY, COLON, DIAGNOSTIC      FEMORAL BYPASS Left 11/16/2016    LEFT LOWER EXTREMITY ANGIOGRAM WITH ATHERECTOMY, BALLOON ANGIPLASTY, AND STENTING OF LEFT FEMORAL AND POPLITEAL ARTERIES performed by Ameya Abarca MD at [facility] OR    FEMORAL BYPASS Right 01/11/2017    INTRAOPERATIVE ANGIOGRAM RIGHT LOWER EXTREMITY WITH PERCUTANEOUS TRANSLUMINAL BALLOON ANGIOPLASTY AND STENTING OF RIGHT SUPERFICIAL FEMORAL/ POPLITEAL ARTERIES performed by Ameya Abarca MD at [facility] OR    ME BYPASS W/VEIN FEMORAL-POPLITEAL Left 07/17/2018    LEFT COMMON FEMORAL ARTERTY TO LEFT BELOW KNEE POPLITEAL ARTERY BYPASS WITH INSITU GREATER SAPHENOUS VEIN GRAFT WITH INTRAOPERATIVE AND COMPLETION ANGIOGRAM performed by Ameya Abarca MD at [facility] OR    UPPER GASTROINTESTINAL ENDOSCOPY      VASCULAR SURGERY  10/11/2016    Right CFA 5f sheath, Aortogram with bilateral lower arteriogram, mynx right CFA. [facility]    VASCULAR SURGERY  11/16/2016    SJS-Ultrasound guided left PTA 4f sheath, right CFA 5f-7f sheath, left lower arteriograms, placement of left popliteal artery emboshield filter, atherectomy left SFA with 2.1/3.0 jetstream, left popliteal/sfa balloon angioplasty 5x200 desiree, 6x150 lutonix (3), left popliteal/sfa stent 6x200 innova, left SFA stents (supera 5.5x150, 5.5x60), left lower extremity arteriograms, mynx right CFA    VASCULAR SURGERY  01/11/2017    SJS-Ultrasound left CFA 5f-7f 70cm sheath, right lower arteriograms, right ROSEMARIE u/s guided 4f sheath, balloon angioplasty right SFA/Pop occulsion with two 5x100 desiree,Right SFA/pop balloon angioplasty 6x200 desiree, right SFA/pop stents (supera 5.5x150, 5.5x60),Right SFA/pop stents balloon angioplasty 6x150 lutonix, right lower arteriograms, right SFA stent (epic 7x40), right lower arteriograms    VASCULAR SURGERY

## 2024-01-10 NOTE — TELEPHONE ENCOUNTER
Called and spoke with patient and his significant other. I advised them of BW note seen below. They voiced their understanding and had no questions at this time.   ----- Message from AVI Jimenez CNP sent at 1/10/2024 12:50 PM CST -----  Please let them know his white count was normal, his kidney labs are slightly worse.  He needs to continue to push fluids.  Potassium was mildly low, recommend electrolyte replacement drinks.

## 2024-01-10 NOTE — PATIENT INSTRUCTIONS
STAT CT Head  Labs today   New MRI Brain, MRI Neck  EEG due to passing out (checks for seizures)  Stop Qulipta  Continue Mamentine  TENS unit for neck/headaches  PT for neck/headaches  Increase hydration

## 2024-01-14 LAB
A-TOCOPHEROL VIT E SERPL-MCNC: 7 MG/L (ref 5.5–18)
BETA+GAMMA TOCOPHEROL SERPL-MCNC: 0.8 MG/L (ref 0–6)

## 2024-02-06 ENCOUNTER — TELEPHONE (OUTPATIENT)
Dept: NEUROLOGY | Age: 74
End: 2024-02-06

## 2024-03-12 DIAGNOSIS — R41.3 MEMORY LOSS: ICD-10-CM

## 2024-03-12 RX ORDER — MEMANTINE HYDROCHLORIDE 5 MG/1
5 TABLET ORAL 2 TIMES DAILY
Qty: 60 TABLET | Refills: 0 | Status: SHIPPED | OUTPATIENT
Start: 2024-03-12

## 2024-03-12 NOTE — TELEPHONE ENCOUNTER
Requested Prescriptions     Pending Prescriptions Disp Refills    memantine (NAMENDA) 5 MG tablet [Pharmacy Med Name: MEMANTINE HCL 5 MG TABLET] 60 tablet 0     Sig: TAKE 1 TABLET BY MOUTH TWICE DAILY.       Last Office Visit: 1/10/2024  Next Office Visit: 4/10/2024  Last Medication Refill: 1/3/2024 with 0 RF

## 2024-03-26 ENCOUNTER — HOSPITAL ENCOUNTER (OUTPATIENT)
Dept: NON INVASIVE DIAGNOSTICS | Age: 74
Discharge: HOME | End: 2024-03-26
Payer: MEDICARE

## 2024-03-26 ENCOUNTER — HOSPITAL ENCOUNTER (OUTPATIENT)
Dept: NON INVASIVE DIAGNOSTICS | Age: 74
End: 2024-03-26
Payer: MEDICARE

## 2024-03-26 ENCOUNTER — OFFICE VISIT (OUTPATIENT)
Dept: VASCULAR SURGERY | Age: 74
End: 2024-03-26
Payer: MEDICARE

## 2024-03-26 ENCOUNTER — HOSPITAL ENCOUNTER (OUTPATIENT)
Dept: VASCULAR LAB | Age: 74
Discharge: HOME | End: 2024-03-26
Payer: MEDICARE

## 2024-03-26 VITALS
SYSTOLIC BLOOD PRESSURE: 141 MMHG | DIASTOLIC BLOOD PRESSURE: 71 MMHG | TEMPERATURE: 97.6 F | OXYGEN SATURATION: 98 % | HEART RATE: 71 BPM

## 2024-03-26 DIAGNOSIS — I70.213 ATHEROSCLER OF NATIVE ARTERY OF BOTH LEGS WITH INTERMIT CLAUDICATION (HCC): ICD-10-CM

## 2024-03-26 DIAGNOSIS — I65.23 BILATERAL CAROTID ARTERY STENOSIS: Primary | ICD-10-CM

## 2024-03-26 DIAGNOSIS — I65.23 BILATERAL CAROTID ARTERY STENOSIS: ICD-10-CM

## 2024-03-26 PROCEDURE — 93922 UPR/L XTREMITY ART 2 LEVELS: CPT

## 2024-03-26 PROCEDURE — 3075F SYST BP GE 130 - 139MM HG: CPT | Performed by: NURSE PRACTITIONER

## 2024-03-26 PROCEDURE — 3078F DIAST BP <80 MM HG: CPT | Performed by: NURSE PRACTITIONER

## 2024-03-26 PROCEDURE — 93880 EXTRACRANIAL BILAT STUDY: CPT

## 2024-03-26 PROCEDURE — 93925 LOWER EXTREMITY STUDY: CPT

## 2024-03-26 PROCEDURE — 99214 OFFICE O/P EST MOD 30 MIN: CPT | Performed by: NURSE PRACTITIONER

## 2024-03-26 PROCEDURE — 1123F ACP DISCUSS/DSCN MKR DOCD: CPT | Performed by: NURSE PRACTITIONER

## 2024-03-26 RX ORDER — TIOTROPIUM BROMIDE INHALATION SPRAY 3.12 UG/1
SPRAY, METERED RESPIRATORY (INHALATION)
COMMUNITY
Start: 2024-03-07

## 2024-03-26 NOTE — PROGRESS NOTES
Dave Reynoso (:  1950) is a 73 y.o. male,Established patient, here for evaluation of the following chief complaint(s):  Follow-up            SUBJECTIVE/OBJECTIVE:  Dave has a history of peripheral vascular disease of the lower extremities.  He has had this for 1 - 5 years. Current treatment includes asa, plavix, and lipitor.  Dave has not had new wounds. Recently, he reports claudication at a distance of  which varies.  Dave reports that the right leg is equal to the left.  He reports claudication is not changed and is mostly in the form of crampy type pain starting in the calves. He has a short recovery time. This is reproducible in nature. He reports ischemic rest pain 0 times per night.  He reports walking with cart does not help.    I have personally reviewed the following: problem list, current meds, allergies, PMH, PSH, family hx, and social hx  Dave Reynoso is a 73 y.o. male with the following history as recorded in 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, left leg ([facility]) 05/15/2020    PVD (peripheral vascular disease) ([facility]) 2019    Atherosclerosis with claudication of extremity ([facility]) 2018    Atherosclerosis of native artery of both lower extremities with intermittent claudication ([facility]) 2016    Hyperlipidemia     Hypertension      Current Outpatient Medications   Medication Sig Dispense Refill    SPIRIVA RESPIMAT 2.5 MCG/ACT AERS inhaler       memantine (NAMENDA) 5 MG tablet TAKE 1 TABLET BY MOUTH TWICE DAILY. 60 tablet 0    losartan-hydroCHLOROthiazide (HYZAAR) 100-25 MG per tablet       losartan (COZAAR) 25 MG tablet Take 1 tablet by mouth daily Take tablet if BP is over 145 consistently      Lactobacillus (ACIDOPHILUS) CAPS capsule Take 1 capsule by mouth daily      clindamycin (CLEOCIN) 300 MG capsule Take 2

## 2024-03-27 ENCOUNTER — TELEPHONE (OUTPATIENT)
Dept: VASCULAR SURGERY | Age: 74
End: 2024-03-27

## 2024-03-27 NOTE — TELEPHONE ENCOUNTER
Contacted the patient to let him know the following.  I let him know we would make a follow up appointment with carotid testing and mail him the appointment card.           ----- Message from AVI Brar sent at 3/27/2024  6:07 AM CDT -----  Please let him know there was no change in his carotids. We will see him in 6 months

## 2024-04-08 DIAGNOSIS — R41.3 MEMORY LOSS: ICD-10-CM

## 2024-04-09 RX ORDER — MEMANTINE HYDROCHLORIDE 5 MG/1
5 TABLET ORAL 2 TIMES DAILY
Qty: 60 TABLET | Refills: 0 | Status: SHIPPED | OUTPATIENT
Start: 2024-04-09

## 2024-04-24 ENCOUNTER — OFFICE VISIT (OUTPATIENT)
Dept: NEUROLOGY | Age: 74
End: 2024-04-24
Payer: MEDICARE

## 2024-04-24 VITALS
SYSTOLIC BLOOD PRESSURE: 106 MMHG | HEART RATE: 73 BPM | DIASTOLIC BLOOD PRESSURE: 63 MMHG | HEIGHT: 67 IN | WEIGHT: 161 LBS | BODY MASS INDEX: 25.27 KG/M2

## 2024-04-24 DIAGNOSIS — F03.90 DEMENTIA, UNSPECIFIED DEMENTIA SEVERITY, UNSPECIFIED DEMENTIA TYPE, UNSPECIFIED WHETHER BEHAVIORAL, PSYCHOTIC, OR MOOD DISTURBANCE OR ANXIETY (HCC): Primary | ICD-10-CM

## 2024-04-24 DIAGNOSIS — I63.9 CEREBROVASCULAR ACCIDENT (CVA), UNSPECIFIED MECHANISM (HCC): ICD-10-CM

## 2024-04-24 DIAGNOSIS — R42 DIZZINESS: ICD-10-CM

## 2024-04-24 DIAGNOSIS — G44.86 CERVICOGENIC HEADACHE: ICD-10-CM

## 2024-04-24 DIAGNOSIS — M54.2 NECK PAIN: ICD-10-CM

## 2024-04-24 PROCEDURE — 99214 OFFICE O/P EST MOD 30 MIN: CPT | Performed by: NURSE PRACTITIONER

## 2024-04-24 PROCEDURE — 1123F ACP DISCUSS/DSCN MKR DOCD: CPT | Performed by: NURSE PRACTITIONER

## 2024-04-24 PROCEDURE — 3074F SYST BP LT 130 MM HG: CPT | Performed by: NURSE PRACTITIONER

## 2024-04-24 PROCEDURE — 3078F DIAST BP <80 MM HG: CPT | Performed by: NURSE PRACTITIONER

## 2024-04-24 NOTE — PROGRESS NOTES
REVIEW OF SYSTEMS    Constitutional: []Fever []Sweat []Chills [] Recent Injury [x] Denies all unless marked  HEENT:[]Headache  [] Head Injury/Hearing Loss  [] Sore Throat  [] Ear Ache/Dizziness  [x] Denies all unless marked  Spine:  [] Neck pain  [] Back pain  [] Sciaticia  [x] Denies all unless marked  Cardiovascular:[]Heart Disease []Chest Pain [] Palpitations  [x] Denies all unless marked  Pulmonary: []Shortness of Breath [x]Cough   [x] Denies all unless marke  Gastrointestinal: []Nausea  []Vomiting  []Abdominal Pain  []Constipation  []Diarrhea  []Dark Bloody Stools  [x] Denies all unless marked  Psychiatric/Behavioral:[] Depression [] Anxiety [x] Denies all unless marked  Genitourinary:   [] Frequency  [] Urgency  [] Incontinence [] Pain with Urination  [x] Denies all unless marked  Extremities: []Pain  []Swelling  [x] Denies all unless marked  Musculoskeletal: [] Muscle Pain  [] Joint Pain  [] Arthritis [] Muscle Cramps [] Muscle Twitches  [x] Denies all unless marked  Sleep: [] Insomnia [] Snoring [] Restless Legs [] Sleep Apnea  [] Daytime Sleepiness  [x] Denies all unless marked  Skin:[] Rash [] Skin Discoloration [x] Denies all unless marked   Neurological: []Visual Disturbance/Memory Loss [] Loss of Balance [] Slurred Speech/Weakness [] Seizures  [] Vertigo/Dizziness [x] Denies all unless marked       
given history of  and infarcts.  Continue memantine 5 mg twice daily, titration limited due to renal function.  Headaches felt to be cervicogenic/tension related.  Largely resolved with PT.  Would like him to use TENS unit as well, will reorder this.  Prior dizziness felt to be combination of multiple things, ongoing vomiting and diarrhea likely playing a large role with underlying orthostatic hypotension.  Discussed orthostatic precautions and increased hydration.  He is doing better in this aspect.  Discussed dementia concerns, discussed donepezil.  Wife concerned about bradycardia side effect, will plan to monitor clinically for now.        ICD-10-CM    1. Dementia, unspecified dementia severity, unspecified dementia type, unspecified whether behavioral, psychotic, or mood disturbance or anxiety ([facility])  F03.90       2. Cervicogenic headache  G44.86       3. Cerebrovascular accident (CVA), unspecified mechanism ([facility])  I63.9       4. Neck pain  M54.2       5. Dizziness  R42             PLAN:  Reorder TENS unit for chronic cervical pain/tension  Continue with pain management for cervical abnormalities.  Neurosurgery referral not felt necessary.  Continue Namenda 5 mg twice daily, underlying dementia for likely.  Unable to titrate due to renal function.  Consider donepezil in the future, wife concerned with risk of bradycardia today.  Can hold on EEG for now, not previously completed.  Syncope felt orthostatic in nature related to underlying dehydration.  Orthostatic precautions, increase hydration as discussed.  Follow-up with GI concerning episodic vomiting and diarrhea. Upcoming colonoscopy and endoscopy.   Dizziness likely multifactorial, increase hydration, monitor.  Has improved  Continue following with vascular as scheduled.  PVD, mild stenosis carotid artery.  On antiplatelet therapy.  Patient will require follow up to evaluate benefit of medication, side effects, and disease progression.   10. Maximize

## 2024-05-13 DIAGNOSIS — R41.3 MEMORY LOSS: ICD-10-CM

## 2024-05-14 RX ORDER — MEMANTINE HYDROCHLORIDE 5 MG/1
5 TABLET ORAL 2 TIMES DAILY
Qty: 60 TABLET | Refills: 5 | Status: SHIPPED | OUTPATIENT
Start: 2024-05-14

## 2024-05-14 NOTE — TELEPHONE ENCOUNTER
Dave Reynoso has requested a refill on his medication.      Last office visit : 4/24/2024   Next office visit : 8/23/2024   Last medication refill :4/9/2024 W/NO RF         Requested Prescriptions     Pending Prescriptions Disp Refills    memantine (NAMENDA) 5 MG tablet [Pharmacy Med Name: MEMANTINE HCL 5 MG TABLET] 60 tablet 0     Sig: TAKE 1 TABLET BY MOUTH TWICE DAILY.

## 2024-06-13 ENCOUNTER — TELEPHONE (OUTPATIENT)
Dept: NEUROLOGY | Age: 74
End: 2024-06-13

## 2024-06-13 NOTE — TELEPHONE ENCOUNTER
ALLAN HSIEH JR (Key: [id number])  PA Case ID #: [id number]  Need Help? Call us at [phone]  Outcome  Approved today  PA Case: [id number], Status: Approved, Coverage Starts on: 3/14/2024 12:00:00 AM, Coverage Ends on: 6/13/2025 12:00:00 AM.  Authorization Expiration Date: 6/12/2025  Drug  Ubrelvy 100MG tablets  ePA cloud logo  Form  [employer] Electronic PA Form (2017 [place]CP[employer])

## 2024-06-16 ENCOUNTER — TELEPHONE (OUTPATIENT)
Dept: NEUROLOGY | Age: 74
End: 2024-06-16

## 2024-08-23 ENCOUNTER — OFFICE VISIT (OUTPATIENT)
Dept: NEUROLOGY | Age: 74
End: 2024-08-23
Payer: MEDICARE

## 2024-08-23 VITALS
WEIGHT: 161 LBS | DIASTOLIC BLOOD PRESSURE: 63 MMHG | OXYGEN SATURATION: 97 % | HEIGHT: 67 IN | SYSTOLIC BLOOD PRESSURE: 103 MMHG | HEART RATE: 97 BPM | BODY MASS INDEX: 25.27 KG/M2

## 2024-08-23 DIAGNOSIS — Z79.899 MEDICATION MANAGEMENT: ICD-10-CM

## 2024-08-23 DIAGNOSIS — G43.009 MIGRAINE WITHOUT AURA AND WITHOUT STATUS MIGRAINOSUS, NOT INTRACTABLE: ICD-10-CM

## 2024-08-23 DIAGNOSIS — F03.90 DEMENTIA, UNSPECIFIED DEMENTIA SEVERITY, UNSPECIFIED DEMENTIA TYPE, UNSPECIFIED WHETHER BEHAVIORAL, PSYCHOTIC, OR MOOD DISTURBANCE OR ANXIETY (HCC): ICD-10-CM

## 2024-08-23 DIAGNOSIS — R42 DIZZINESS: ICD-10-CM

## 2024-08-23 DIAGNOSIS — I69.30 HISTORY OF CVA WITH RESIDUAL DEFICIT: ICD-10-CM

## 2024-08-23 DIAGNOSIS — G44.86 CERVICOGENIC HEADACHE: ICD-10-CM

## 2024-08-23 DIAGNOSIS — R41.3 MEMORY LOSS: Primary | ICD-10-CM

## 2024-08-23 PROCEDURE — 3078F DIAST BP <80 MM HG: CPT | Performed by: NURSE PRACTITIONER

## 2024-08-23 PROCEDURE — 99214 OFFICE O/P EST MOD 30 MIN: CPT | Performed by: NURSE PRACTITIONER

## 2024-08-23 PROCEDURE — 1123F ACP DISCUSS/DSCN MKR DOCD: CPT | Performed by: NURSE PRACTITIONER

## 2024-08-23 PROCEDURE — 3074F SYST BP LT 130 MM HG: CPT | Performed by: NURSE PRACTITIONER

## 2024-08-23 RX ORDER — RIMEGEPANT SULFATE 75 MG/75MG
TABLET, ORALLY DISINTEGRATING ORAL
Qty: 8 TABLET | Refills: 3 | Status: SHIPPED | OUTPATIENT
Start: 2024-08-23

## 2024-08-23 RX ORDER — FLUTICASONE PROPIONATE AND SALMETEROL 500; 50 UG/1; UG/1
POWDER RESPIRATORY (INHALATION)
COMMUNITY
Start: 2024-08-02

## 2024-08-23 NOTE — PROGRESS NOTES
REVIEW OF SYSTEMS    Constitutional: []Fever []Sweats []Chills [] Recent Injury [x] Denies all unless marked  HEENT:[]Headache  [] Head Injury [] Hearing Loss  [] Sore Throat  [] Ear Ache [x] Denies all unless marked  Spine:  [] Neck pain  [] Back pain  [] Sciaticia  [x] Denies all unless marked  Cardiovascular:[]Heart Disease []Palpitations [] Chest Pain   [x] Denies all unless marked  Pulmonary: []Shortness of Breath []Cough   [x] Denies all unless marked  Psychiatric/Behavioral:[] Depression [] Anxiety [x] Denies all unless marked  Gastrointestinal: []Nausea  []Vomiting  []Abdominal Pain  []Constipation  []Diarrhea  [x] Denies all unless marked  Genitourinary:   [] Frequency  [] Urgency  [] Dysuria [] Incontinence  [x] Denies all unless marked  Extremities: []Pain  []Swelling  [x] Denies all unless marked  Musculoskeletal: [] Myalgias  [] Joint Pain  [] Arthritis [] Muscle Cramps [] Muscle Twitches  [x] Denies all unless marked  Sleep: []Insomnia[]Snoring []Restless Legs  []Sleep Apnea  []Daytime Sleepiness  [x] Denies all unless marked  Skin:[] Rash [] Color Change [x] Denies all unless marked   Neurological:[]Visual Disturbance [] Memory Loss []Loss of Balance []Slurred Speech []Weakness []Seizures  [] Dizziness [x] Denies all unless marked      
procedures, documenting information in the medical record and care coordination.      This dictation was generated by voice recognition computer software.  Although all attempts are made to edit the dictation for accuracy, there may be errors in the transcription that are not intended.

## 2024-08-26 ENCOUNTER — TELEPHONE (OUTPATIENT)
Dept: VASCULAR SURGERY | Age: 74
End: 2024-08-26

## 2024-10-01 ENCOUNTER — HOSPITAL ENCOUNTER (OUTPATIENT)
Dept: NON INVASIVE DIAGNOSTICS | Age: 74
Discharge: HOME | End: 2024-10-03
Payer: MEDICARE

## 2024-10-01 ENCOUNTER — SCHEDULED TELEPHONE ENCOUNTER (OUTPATIENT)
Dept: VASCULAR SURGERY | Age: 74
End: 2024-10-01
Payer: MEDICARE

## 2024-10-01 DIAGNOSIS — I65.23 BILATERAL CAROTID ARTERY STENOSIS: ICD-10-CM

## 2024-10-01 DIAGNOSIS — I70.213 ATHEROSCLER OF NATIVE ARTERY OF BOTH LEGS WITH INTERMIT CLAUDICATION (HCC): Primary | ICD-10-CM

## 2024-10-01 DIAGNOSIS — I70.213 ATHEROSCLER OF NATIVE ARTERY OF BOTH LEGS WITH INTERMIT CLAUDICATION (HCC): ICD-10-CM

## 2024-10-01 LAB
VAS IMMEDIATE LEFT ABI: 1.01
VAS IMMEDIATE LEFT DORSALIS PEDIS BP: 185 MMHG
VAS IMMEDIATE RIGHT ABI: 0.87
VAS IMMEDIATE RIGHT BRACHIAL BP: 183 MMHG
VAS IMMEDIATE RIGHT DORSALIS PEDIS BP: 160 MMHG
VAS LEFT ABI: 1.06
VAS LEFT ARM BP: 171 MMHG
VAS LEFT ARTERIAL PROX ANASTOMOSIS PSV: 153 CM/S
VAS LEFT ATA DIST PSV: 86 CM/S
VAS LEFT ATA MID PSV: 79 CM/S
VAS LEFT ATA PROX PSV: 93 CM/S
VAS LEFT CCA DIST EDV: 17.6 CM/S
VAS LEFT CCA DIST PSV: 91.5 CM/S
VAS LEFT CCA MID EDV: 16.4 CM/S
VAS LEFT CCA MID PSV: 76.2 CM/S
VAS LEFT CFA MID PSV: 178 CM/S
VAS LEFT DIST OUTFLOW PSV: 86 CM/S
VAS LEFT DORSALIS PEDIS BP: 190 MMHG
VAS LEFT ECA EDV: 6.45 CM/S
VAS LEFT ECA PSV: 62.7 CM/S
VAS LEFT GRAFT 1: NORMAL
VAS LEFT ICA DIST EDV: 28.4 CM/S
VAS LEFT ICA DIST PSV: 138 CM/S
VAS LEFT ICA MID EDV: 31.4 CM/S
VAS LEFT ICA MID PSV: 170 CM/S
VAS LEFT ICA PROX EDV: 45.2 CM/S
VAS LEFT ICA PROX PSV: 166 CM/S
VAS LEFT MID OUTFLOW PSV: 72 CM/S
VAS LEFT PERONEAL DIST PSV: 49 CM/S
VAS LEFT PERONEAL MID PSV: 63 CM/S
VAS LEFT PERONEAL PROX PSV: 61 CM/S
VAS LEFT PFA PROX PSV: 199 CM/S
VAS LEFT PROX OUTFLOW PSV: 90 CM/S
VAS LEFT PTA BP: 177 MMHG
VAS LEFT PTA DIST PSV: 89 CM/S
VAS LEFT PTA MID PSV: 76 CM/S
VAS LEFT PTA PROX PSV: 76 CM/S
VAS LEFT TBI: 0.83
VAS LEFT TOE PRESSURE: 148 MMHG
VAS LEFT VENOUS DIST ANASTOMOSIS PSV: 109 CM/S
VAS LEFT VERTEBRAL EDV: 12.6 CM/S
VAS LEFT VERTEBRAL PSV: 40.5 CM/S
VAS RIGHT ABI: 0.94
VAS RIGHT ARM BP: 179 MMHG
VAS RIGHT CCA DIST EDV: 16.4 CM/S
VAS RIGHT CCA DIST PSV: 66.3 CM/S
VAS RIGHT CCA MID EDV: 15.2 CM/S
VAS RIGHT CCA MID PSV: 79.7 CM/S
VAS RIGHT DORSALIS PEDIS BP: 168 MMHG
VAS RIGHT ECA EDV: 9.97 CM/S
VAS RIGHT ECA PSV: 80.3 CM/S
VAS RIGHT ICA DIST EDV: 24 CM/S
VAS RIGHT ICA DIST PSV: 75.6 CM/S
VAS RIGHT ICA MID EDV: 21.1 CM/S
VAS RIGHT ICA MID PSV: 78 CM/S
VAS RIGHT ICA PROX EDV: 15.8 CM/S
VAS RIGHT ICA PROX PSV: 59.2 CM/S
VAS RIGHT PTA BP: 163 MMHG
VAS RIGHT TBI: 0.78
VAS RIGHT TOE PRESSURE: 140 MMHG
VAS RIGHT VERTEBRAL EDV: 17.6 CM/S
VAS RIGHT VERTEBRAL PSV: 52.2 CM/S

## 2024-10-01 PROCEDURE — 93880 EXTRACRANIAL BILAT STUDY: CPT

## 2024-10-01 PROCEDURE — 93922 UPR/L XTREMITY ART 2 LEVELS: CPT

## 2024-10-01 PROCEDURE — 93922 UPR/L XTREMITY ART 2 LEVELS: CPT | Performed by: SURGERY

## 2024-10-01 PROCEDURE — 93926 LOWER EXTREMITY STUDY: CPT

## 2024-10-01 PROCEDURE — 99442 PR PHYS/QHP TELEPHONE EVALUATION 11-20 MIN: CPT | Performed by: NURSE PRACTITIONER

## 2024-10-01 PROCEDURE — 93926 LOWER EXTREMITY STUDY: CPT | Performed by: SURGERY

## 2024-10-01 PROCEDURE — 93880 EXTRACRANIAL BILAT STUDY: CPT | Performed by: SURGERY

## 2024-10-01 NOTE — PROGRESS NOTES
in leg(s) or hip(s) with walking.  Take good care of your feet.  Let us know right away if you develop a wound on your foot.  Patient instructed to call or proceed to the emergency room with any symptoms of lateralizing weakness, loss of vision in one eye, or episodes slurred speech.        Documentation:  I communicated with the patient and/or health care decision maker about pvd.   Details of this discussion including any medical advice provided: as above      I affirm this is a Patient Initiated Episode with a Patient who has not had a related appointment within my department in the past 7 days or scheduled within the next 24 hours.    Patient identification was verified at the start of the visit: Yes    Total Time: minutes: 11-20 minutes    Dave Reynoso was evaluated through a synchronous (real-time) audio encounter. Patient identification was verified at the start of the visit. He (or guardian if applicable) is aware that this is a billable service, which includes applicable co-pays. This visit was conducted with the patient's (and/or legal guardian's) verbal consent. He has not had a related appointment within my department in the past 7 days or scheduled within the next 24 hours.   The patient was located at Home: [address] [place] [id number].  The provider was located at Facility (Appt Dept): [facility].  [address]  [place] [address].    Note: not billable if this call serves to triage the patient into an appointment for the relevant concern    AVI Brar

## 2024-11-06 DIAGNOSIS — R41.3 MEMORY LOSS: ICD-10-CM

## 2024-11-06 RX ORDER — MEMANTINE HYDROCHLORIDE 5 MG/1
5 TABLET ORAL 2 TIMES DAILY
Qty: 60 TABLET | Refills: 0 | Status: SHIPPED | OUTPATIENT
Start: 2024-11-06

## 2024-11-06 NOTE — TELEPHONE ENCOUNTER
Requested Prescriptions     Pending Prescriptions Disp Refills    memantine (NAMENDA) 5 MG tablet [Pharmacy Med Name: MEMANTINE HCL 5 MG TABLET] 60 tablet 0     Sig: TAKE 1 TABLET BY MOUTH TWICE DAILY.       Last Office Visit: 8/23/2024  Next Office Visit: 1/23/2025  Last Medication Refill: 05/14/2024 R:5

## 2024-12-04 DIAGNOSIS — R41.3 MEMORY LOSS: ICD-10-CM

## 2024-12-04 NOTE — TELEPHONE ENCOUNTER
Requested Prescriptions     Pending Prescriptions Disp Refills    memantine (NAMENDA) 5 MG tablet [Pharmacy Med Name: MEMANTINE HCL 5 MG TABLET] 60 tablet 0     Sig: TAKE 1 TABLET BY MOUTH TWICE DAILY.       Last Office Visit: 8/23/2024  Next Office Visit: 1/23/2025  Last Medication Refill: 11/6/2024 with 0 RF

## 2024-12-05 RX ORDER — MEMANTINE HYDROCHLORIDE 5 MG/1
5 TABLET ORAL 2 TIMES DAILY
Qty: 60 TABLET | Refills: 0 | Status: SHIPPED | OUTPATIENT
Start: 2024-12-05

## 2025-01-17 DIAGNOSIS — R41.3 MEMORY LOSS: ICD-10-CM

## 2025-01-20 RX ORDER — MEMANTINE HYDROCHLORIDE 5 MG/1
5 TABLET ORAL 2 TIMES DAILY
Qty: 60 TABLET | Refills: 0 | Status: SHIPPED | OUTPATIENT
Start: 2025-01-20

## 2025-02-03 ENCOUNTER — APPOINTMENT (OUTPATIENT)
Dept: GENERAL RADIOLOGY | Age: 75
End: 2025-02-03
Attending: STUDENT IN AN ORGANIZED HEALTH CARE EDUCATION/TRAINING PROGRAM
Payer: MEDICARE

## 2025-02-03 ENCOUNTER — HOSPITAL ENCOUNTER (EMERGENCY)
Age: 75
Discharge: HOME | End: 2025-02-03
Attending: STUDENT IN AN ORGANIZED HEALTH CARE EDUCATION/TRAINING PROGRAM
Payer: MEDICARE

## 2025-02-03 VITALS
RESPIRATION RATE: 20 BRPM | HEIGHT: 67 IN | SYSTOLIC BLOOD PRESSURE: 195 MMHG | TEMPERATURE: 98.2 F | BODY MASS INDEX: 25.74 KG/M2 | OXYGEN SATURATION: 100 % | WEIGHT: 164 LBS | DIASTOLIC BLOOD PRESSURE: 88 MMHG | HEART RATE: 84 BPM

## 2025-02-03 DIAGNOSIS — R06.00 DYSPNEA, UNSPECIFIED TYPE: Primary | ICD-10-CM

## 2025-02-03 LAB
ALBUMIN SERPL-MCNC: 4 G/DL (ref 3.5–5.2)
ALP SERPL-CCNC: 128 U/L (ref 40–129)
ALT SERPL-CCNC: 15 U/L (ref 5–41)
ANION GAP SERPL CALCULATED.3IONS-SCNC: 15 MMOL/L (ref 8–16)
AST SERPL-CCNC: 14 U/L (ref 5–40)
BASOPHILS # BLD: 0.1 K/UL (ref 0–0.2)
BASOPHILS NFR BLD: 0.7 % (ref 0–1)
BILIRUB SERPL-MCNC: 0.3 MG/DL (ref 0.2–1.2)
BUN SERPL-MCNC: 15 MG/DL (ref 8–23)
CALCIUM SERPL-MCNC: 9.4 MG/DL (ref 8.8–10.2)
CHLORIDE SERPL-SCNC: 102 MMOL/L (ref 98–107)
CO2 SERPL-SCNC: 23 MMOL/L (ref 22–29)
CREAT SERPL-MCNC: 1.7 MG/DL (ref 0.7–1.2)
D DIMER PPP FEU-MCNC: 0.34 UG/ML FEU (ref 0–0.48)
EOSINOPHIL # BLD: 0.1 K/UL (ref 0–0.6)
EOSINOPHIL NFR BLD: 0.8 % (ref 0–5)
ERYTHROCYTE [DISTWIDTH] IN BLOOD BY AUTOMATED COUNT: 14.2 % (ref 11.5–14.5)
GLUCOSE SERPL-MCNC: 82 MG/DL (ref 70–99)
HCT VFR BLD AUTO: 41.9 % (ref 42–52)
HGB BLD-MCNC: 14 G/DL (ref 14–18)
IMM GRANULOCYTES # BLD: 0.1 K/UL
LYMPHOCYTES # BLD: 3.7 K/UL (ref 1.1–4.5)
LYMPHOCYTES NFR BLD: 34.3 % (ref 20–40)
MAGNESIUM SERPL-MCNC: 1.9 MG/DL (ref 1.6–2.4)
MCH RBC QN AUTO: 30.2 PG (ref 27–31)
MCHC RBC AUTO-ENTMCNC: 33.4 G/DL (ref 33–37)
MCV RBC AUTO: 90.5 FL (ref 80–94)
MONOCYTES # BLD: 1 K/UL (ref 0–0.9)
MONOCYTES NFR BLD: 9.2 % (ref 0–10)
NEUTROPHILS # BLD: 5.9 K/UL (ref 1.5–7.5)
NEUTS SEG NFR BLD: 54.4 % (ref 50–65)
PLATELET # BLD AUTO: 268 K/UL (ref 130–400)
PMV BLD AUTO: 10.1 FL (ref 9.4–12.4)
POTASSIUM SERPL-SCNC: 3.7 MMOL/L (ref 3.5–5.1)
PROT SERPL-MCNC: 7 G/DL (ref 6.4–8.3)
RBC # BLD AUTO: 4.63 M/UL (ref 4.7–6.1)
SODIUM SERPL-SCNC: 140 MMOL/L (ref 136–145)
TROPONIN, HIGH SENSITIVITY: <6 NG/L (ref 0–22)
WBC # BLD AUTO: 10.9 K/UL (ref 4.8–10.8)

## 2025-02-03 PROCEDURE — 85379 FIBRIN DEGRADATION QUANT: CPT

## 2025-02-03 PROCEDURE — 71045 X-RAY EXAM CHEST 1 VIEW: CPT

## 2025-02-03 PROCEDURE — 36415 COLL VENOUS BLD VENIPUNCTURE: CPT

## 2025-02-03 PROCEDURE — 83735 ASSAY OF MAGNESIUM: CPT

## 2025-02-03 PROCEDURE — 99285 EMERGENCY DEPT VISIT HI MDM: CPT

## 2025-02-03 PROCEDURE — 85025 COMPLETE CBC W/AUTO DIFF WBC: CPT

## 2025-02-03 PROCEDURE — 84484 ASSAY OF TROPONIN QUANT: CPT

## 2025-02-03 PROCEDURE — 93005 ELECTROCARDIOGRAM TRACING: CPT | Performed by: STUDENT IN AN ORGANIZED HEALTH CARE EDUCATION/TRAINING PROGRAM

## 2025-02-03 PROCEDURE — 80053 COMPREHEN METABOLIC PANEL: CPT

## 2025-02-03 ASSESSMENT — ENCOUNTER SYMPTOMS
SHORTNESS OF BREATH: 0
ABDOMINAL PAIN: 0
VOMITING: 0
COUGH: 1
NAUSEA: 0

## 2025-02-03 NOTE — ED PROVIDER NOTES
[facility] EMERGENCY DEPARTMENT  eMERGENCY dEPARTMENT eNCOUnter      Pt Name: Dave Reynoso  MRN: [id number]  Birthdate 1950  Date of evaluation: 2/3/2025  Provider: Aden Stanley MD    Chief Complaint:  Chief Complaint   Patient presents with    Shortness of Breath     HPI    Dave Reynoso is a 74 y.o. male who presents to the emergency department with shortness of breath. He had an argument with his girlfriend this evening and became anxious and developed trouble breathing. He takes a test from a lung doctor every 6 months but does not know of any formal diagnoses that he carries. RoS positive for chest piain. He says it feels tight, a little, not too tight. He gets it off and on. The tightness is a 6 or 7 out of 10. He says it is not hurting or bothering him at present.     RoS positive for cough. Present for \"a good little while.' Productive of green. No blood. No known h/o DVT. Does have PAD and CAD. No swelling or pain in the legs.     No recent prolonged travel, surgeries, serious injuries, or immobilizations. No active cancer, no history of DVT or hemoptysis. He says he takes a blood tinner for his blood.    NursingNotes were reviewed.    Review of Systems   Constitutional:  Negative for fever.   Respiratory:  Positive for cough. Negative for shortness of breath.    Cardiovascular:  Negative for chest pain and leg swelling.   Gastrointestinal:  Negative for abdominal pain, nausea and vomiting.   Genitourinary:  Negative for difficulty urinating and dysuria.   Neurological:  Negative for syncope.       Past Medical History:   Diagnosis Date    Anxiety     Moyer's esophagus     Benign prostatic hyperplasia     Blood circulation, collateral     Bronchitis     CAD (coronary artery disease)     Cough     Depression     Disorder of vision     Dizziness     Duodenitis     GERD with esophagitis     Headache     Hyperlipidemia     Hypertension     Muscle pain     Muscle weakness     Neuropathy     Peripheral

## 2025-02-04 NOTE — DISCHARGE INSTRUCTIONS
Please return to the emergency department for worsening trouble breathing, passing out, chest pain, or any new emergencies. Otherwise call your doctor for a follow-up appointment. See your cardiologist in two days as planned.

## 2025-02-06 LAB
EKG P AXIS: 45 DEGREES
EKG P-R INTERVAL: 164 MS
EKG Q-T INTERVAL: 392 MS
EKG QRS DURATION: 88 MS
EKG QTC CALCULATION (BAZETT): 418 MS
EKG T AXIS: 42 DEGREES

## 2025-02-06 PROCEDURE — 93010 ELECTROCARDIOGRAM REPORT: CPT | Performed by: INTERNAL MEDICINE

## 2025-02-12 DIAGNOSIS — R41.3 MEMORY LOSS: ICD-10-CM

## 2025-02-13 RX ORDER — MEMANTINE HYDROCHLORIDE 5 MG/1
5 TABLET ORAL 2 TIMES DAILY
Qty: 60 TABLET | Refills: 5 | Status: SHIPPED | OUTPATIENT
Start: 2025-02-13

## 2025-02-13 NOTE — TELEPHONE ENCOUNTER
Requested Prescriptions     Pending Prescriptions Disp Refills    memantine (NAMENDA) 5 MG tablet [Pharmacy Med Name: MEMANTINE HCL 5 MG TABLET] 60 tablet 5     Sig: TAKE 1 TABLET BY MOUTH TWICE DAILY.       Last Office Visit: 8/23/2024  Next Office Visit:  needs follow up    Last Medication Refill:1/20/2025

## 2025-04-01 ENCOUNTER — TELEPHONE (OUTPATIENT)
Dept: VASCULAR SURGERY | Age: 75
End: 2025-04-01

## 2025-08-11 DIAGNOSIS — R41.3 MEMORY LOSS: ICD-10-CM

## 2025-08-11 RX ORDER — MEMANTINE HYDROCHLORIDE 5 MG/1
5 TABLET ORAL 2 TIMES DAILY
Qty: 60 TABLET | Refills: 5 | Status: SHIPPED | OUTPATIENT
Start: 2025-08-11

## 2025-09-04 ENCOUNTER — TELEPHONE (OUTPATIENT)
Dept: VASCULAR SURGERY | Age: 75
End: 2025-09-04

## (undated) DEVICE — GLOVE SURG SZ 7 L12IN FNGR THK94MIL TRNSLUC YEL LTX HYDRGEL

## (undated) DEVICE — BANDAGE COMPR W6INXL12FT SMOOTH FOR LIMB EXSANG ESMARCH

## (undated) DEVICE — GLOW 'N TELL 30CM TAPE (50 STRIPS): Brand: VASCUTAPE RADIOPAQUE TAPE

## (undated) DEVICE — SOLUTION IV 1000ML 0.9% SOD CHL PH 5 INJ USP VIAFLX PLAS

## (undated) DEVICE — Z INACTIVE USE 2535480 CLIP LIG M BLU TI HRT SHP WIRE HORZ 180 PER BX

## (undated) DEVICE — MEDIA CONTRAST INJ VISIPAQUE 150ML 320MG

## (undated) DEVICE — STAPLER SKIN L39MM DIA0.53MM CRWN 5.7MM S STL FIX HD PROX

## (undated) DEVICE — SUTURE PROL SZ 6-0 L30IN NONABSORBABLE BLU L9.3MM BV-1 3/8 M8709

## (undated) DEVICE — SOLUTION IRRIG 1000ML 09% SOD CHL USP PIC PLAS CONTAINER

## (undated) DEVICE — SUTURE VCRL SZ 3-0 L18IN ABSRB UD W/O NDL POLYGLACTIN 910 J110T

## (undated) DEVICE — THREE QUARTER SHEET: Brand: CONVERTORS

## (undated) DEVICE — RADIFOCUS GLIDECATH: Brand: GLIDECATH

## (undated) DEVICE — SUTURE PROL SZ 6-0 L24IN NONABSORBABLE BLU L9.3MM BV-1 3/8 8805H

## (undated) DEVICE — CLIP INT SM WIDE RED TI TRNSVRS GRV CHEVRON SHP W/ PRECIS

## (undated) DEVICE — CATHETER KIT 5 FR 21 GAX7 CM MICROINTRODUCER GUIDEWIRE STIFF

## (undated) DEVICE — CANNULA PERF L2IN BLNT TIP 2MM VES CLR RADPQ BODY FEM LUER

## (undated) DEVICE — SURGICAL PROCEDURE PACK VASC LOURDES HOSP

## (undated) DEVICE — SUTURE VCRL SZ 3-0 L18IN ABSRB UD L26MM SH 1/2 CIR J864D

## (undated) DEVICE — SUTURE PROL SZ 7-0 L24IN NONABSORBABLE BLU L9.3MM BV-1 3/8 M8702

## (undated) DEVICE — SUTURE VCRL SZ 3-0 L27IN ABSRB UD L26MM SH 1/2 CIR J416H

## (undated) DEVICE — ZIMMER® STERILE DISPOSABLE TOURNIQUET CUFF WITH PLC, DUAL PORT, SINGLE BLADDER, 34 IN. (86 CM)

## (undated) DEVICE — SUTURE VCRL SZ 2-0 L18IN ABSRB UD POLYGLACTIN 910 BRAID TIE J111T

## (undated) DEVICE — TOWEL,OR,DSP,ST,BLUE,DLX,4/PK,20PK/CS: Brand: MEDLINE

## (undated) DEVICE — SUTURE NONABSORBABLE MONOFILAMENT 4-0 RB-1 36 IN BLU PROLENE 8557H

## (undated) DEVICE — SUTURE VCRL SZ 4-0 L18IN ABSRB UD VCRL POLYGLACTIN 910 COAT J109T

## (undated) DEVICE — GLIDESHEATH BASIC HYDROPHILIC COATED INTRODUCER SHEATH: Brand: GLIDESHEATH

## (undated) DEVICE — GEL US 20GM NONIRRITATING OVERWRAPPED FILE PCH TRNSMIT

## (undated) DEVICE — SUTURE VCRL CTRL REL 2-0 CTX 18IN ABSRB BRAID UD J723D

## (undated) DEVICE — C-ARM: Brand: UNBRANDED

## (undated) DEVICE — DRAPE 33X23IN INCISE ANTIMICROB IOBAN 2

## (undated) DEVICE — SPONGE LAP W18XL18IN WHT COT 4 PLY FLD STRUNG RADPQ DISP ST

## (undated) DEVICE — KIT URIN CATHETER 16 FR 5 CC M INDWL SIL

## (undated) DEVICE — SOLUTION IV 100ML 0.9% SOD CHL PLAS CONT USP VIAFLX 1 PER

## (undated) DEVICE — SUTURE VCRL SZ 2-0 L36IN ABSRB UD L36MM CT-1 1/2 CIR J945H